# Patient Record
Sex: FEMALE | Race: WHITE | NOT HISPANIC OR LATINO | URBAN - METROPOLITAN AREA
[De-identification: names, ages, dates, MRNs, and addresses within clinical notes are randomized per-mention and may not be internally consistent; named-entity substitution may affect disease eponyms.]

---

## 2021-03-29 ENCOUNTER — HISTORICAL (OUTPATIENT)
Dept: ADMINISTRATIVE | Facility: HOSPITAL | Age: 56
End: 2021-03-29

## 2021-03-29 LAB
ABS NEUT (OLG): 2.41 X10(3)/MCL (ref 2.1–9.2)
ALBUMIN SERPL-MCNC: 4 GM/DL (ref 3.5–5)
ALBUMIN/GLOB SERPL: 1 RATIO (ref 1.1–2)
ALP SERPL-CCNC: 152 UNIT/L (ref 40–150)
ALT SERPL-CCNC: 14 UNIT/L (ref 0–55)
AST SERPL-CCNC: 17 UNIT/L (ref 5–34)
BASOPHILS # BLD AUTO: 0 X10(3)/MCL (ref 0–0.2)
BASOPHILS NFR BLD AUTO: 0 %
BILIRUB SERPL-MCNC: 0.5 MG/DL
BILIRUBIN DIRECT+TOT PNL SERPL-MCNC: 0.1 MG/DL (ref 0–0.5)
BILIRUBIN DIRECT+TOT PNL SERPL-MCNC: 0.4 MG/DL (ref 0–0.8)
BUN SERPL-MCNC: 11.7 MG/DL (ref 9.8–20.1)
CALCIUM SERPL-MCNC: 9.8 MG/DL (ref 8.4–10.2)
CEA SERPL-MCNC: 1.9 NG/ML (ref 0–3)
CHLORIDE SERPL-SCNC: 102 MMOL/L (ref 98–107)
CO2 SERPL-SCNC: 26 MMOL/L (ref 22–29)
CREAT SERPL-MCNC: 0.74 MG/DL (ref 0.55–1.02)
EOSINOPHIL # BLD AUTO: 0.1 X10(3)/MCL (ref 0–0.9)
EOSINOPHIL NFR BLD AUTO: 3 %
ERYTHROCYTE [DISTWIDTH] IN BLOOD BY AUTOMATED COUNT: 12.8 % (ref 11.5–14.5)
ESTRADIOL SERPL HS-MCNC: <24 PG/ML
FSH SERPL-ACNC: 65.7 MIU/ML
GLOBULIN SER-MCNC: 4.1 GM/DL (ref 2.4–3.5)
GLUCOSE SERPL-MCNC: 114 MG/DL (ref 74–100)
HCT VFR BLD AUTO: 44.5 % (ref 35–46)
HGB BLD-MCNC: 14.3 GM/DL (ref 12–16)
IMM GRANULOCYTES # BLD AUTO: 0.01 10*3/UL
IMM GRANULOCYTES NFR BLD AUTO: 0 %
LYMPHOCYTES # BLD AUTO: 1.1 X10(3)/MCL (ref 0.6–4.6)
LYMPHOCYTES NFR BLD AUTO: 27 %
MCH RBC QN AUTO: 29.5 PG (ref 26–34)
MCHC RBC AUTO-ENTMCNC: 32.1 GM/DL (ref 31–37)
MCV RBC AUTO: 91.9 FL (ref 80–100)
MONOCYTES # BLD AUTO: 0.3 X10(3)/MCL (ref 0.1–1.3)
MONOCYTES NFR BLD AUTO: 8 %
NEUTROPHILS # BLD AUTO: 2.41 X10(3)/MCL (ref 2.1–9.2)
NEUTROPHILS NFR BLD AUTO: 61 %
PLATELET # BLD AUTO: 243 X10(3)/MCL (ref 130–400)
PMV BLD AUTO: 10.7 FL (ref 7.4–10.4)
POTASSIUM SERPL-SCNC: 3.8 MMOL/L (ref 3.5–5.1)
PROT SERPL-MCNC: 8.1 GM/DL (ref 6.4–8.3)
RBC # BLD AUTO: 4.84 X10(6)/MCL (ref 4–5.2)
SODIUM SERPL-SCNC: 139 MMOL/L (ref 136–145)
WBC # SPEC AUTO: 4 X10(3)/MCL (ref 4.5–11)

## 2021-04-08 ENCOUNTER — HISTORICAL (OUTPATIENT)
Dept: RADIOLOGY | Facility: HOSPITAL | Age: 56
End: 2021-04-08

## 2021-04-21 ENCOUNTER — HISTORICAL (OUTPATIENT)
Dept: RADIOLOGY | Facility: HOSPITAL | Age: 56
End: 2021-04-21

## 2021-04-26 ENCOUNTER — HISTORICAL (OUTPATIENT)
Dept: RADIOLOGY | Facility: HOSPITAL | Age: 56
End: 2021-04-26

## 2021-04-30 ENCOUNTER — HISTORICAL (OUTPATIENT)
Dept: LAB | Facility: HOSPITAL | Age: 56
End: 2021-04-30

## 2021-04-30 LAB — SARS-COV-2 AG RESP QL IA.RAPID: NEGATIVE

## 2021-05-11 ENCOUNTER — HISTORICAL (OUTPATIENT)
Dept: SURGERY | Facility: HOSPITAL | Age: 56
End: 2021-05-11

## 2021-05-11 LAB — SARS-COV-2 AG RESP QL IA.RAPID: NEGATIVE

## 2021-05-25 ENCOUNTER — HISTORICAL (OUTPATIENT)
Dept: ADMINISTRATIVE | Facility: HOSPITAL | Age: 56
End: 2021-05-25

## 2021-05-25 LAB
ABS NEUT (OLG): 3.06 X10(3)/MCL (ref 2.1–9.2)
ALBUMIN SERPL-MCNC: 4 GM/DL (ref 3.5–5)
ALBUMIN/GLOB SERPL: 0.9 RATIO (ref 1.1–2)
ALP SERPL-CCNC: 174 UNIT/L (ref 40–150)
ALT SERPL-CCNC: 37 UNIT/L (ref 0–55)
AST SERPL-CCNC: 51 UNIT/L (ref 5–34)
BASOPHILS # BLD AUTO: 0 X10(3)/MCL (ref 0–0.2)
BASOPHILS NFR BLD AUTO: 0 %
BILIRUB SERPL-MCNC: 0.5 MG/DL
BILIRUBIN DIRECT+TOT PNL SERPL-MCNC: 0.2 MG/DL (ref 0–0.5)
BILIRUBIN DIRECT+TOT PNL SERPL-MCNC: 0.3 MG/DL (ref 0–0.8)
BUN SERPL-MCNC: 17.5 MG/DL (ref 9.8–20.1)
CALCIUM SERPL-MCNC: 10.1 MG/DL (ref 8.4–10.2)
CHLORIDE SERPL-SCNC: 106 MMOL/L (ref 98–107)
CO2 SERPL-SCNC: 24 MMOL/L (ref 22–29)
CREAT SERPL-MCNC: 0.76 MG/DL (ref 0.55–1.02)
EOSINOPHIL # BLD AUTO: 0.1 X10(3)/MCL (ref 0–0.9)
EOSINOPHIL NFR BLD AUTO: 2 %
ERYTHROCYTE [DISTWIDTH] IN BLOOD BY AUTOMATED COUNT: 13.2 % (ref 11.5–14.5)
GLOBULIN SER-MCNC: 4.3 GM/DL (ref 2.4–3.5)
GLUCOSE SERPL-MCNC: 100 MG/DL (ref 74–100)
HCT VFR BLD AUTO: 45.5 % (ref 35–46)
HGB BLD-MCNC: 14.7 GM/DL (ref 12–16)
IMM GRANULOCYTES # BLD AUTO: 0.01 10*3/UL
IMM GRANULOCYTES NFR BLD AUTO: 0 %
LYMPHOCYTES # BLD AUTO: 1.2 X10(3)/MCL (ref 0.6–4.6)
LYMPHOCYTES NFR BLD AUTO: 24 %
MCH RBC QN AUTO: 29.9 PG (ref 26–34)
MCHC RBC AUTO-ENTMCNC: 32.3 GM/DL (ref 31–37)
MCV RBC AUTO: 92.7 FL (ref 80–100)
MONOCYTES # BLD AUTO: 0.6 X10(3)/MCL (ref 0.1–1.3)
MONOCYTES NFR BLD AUTO: 11 %
NEUTROPHILS # BLD AUTO: 3.06 X10(3)/MCL (ref 2.1–9.2)
NEUTROPHILS NFR BLD AUTO: 62 %
NRBC BLD AUTO-RTO: 0 % (ref 0–0.2)
PLATELET # BLD AUTO: 228 X10(3)/MCL (ref 130–400)
PMV BLD AUTO: 10.6 FL (ref 7.4–10.4)
POTASSIUM SERPL-SCNC: 4.1 MMOL/L (ref 3.5–5.1)
PROT SERPL-MCNC: 8.3 GM/DL (ref 6.4–8.3)
RBC # BLD AUTO: 4.91 X10(6)/MCL (ref 4–5.2)
SODIUM SERPL-SCNC: 141 MMOL/L (ref 136–145)
WBC # SPEC AUTO: 4.9 X10(3)/MCL (ref 4.5–11)

## 2021-09-20 ENCOUNTER — HISTORICAL (OUTPATIENT)
Dept: ADMINISTRATIVE | Facility: HOSPITAL | Age: 56
End: 2021-09-20

## 2021-09-20 LAB
ABS NEUT (OLG): 3.69 X10(3)/MCL (ref 2.1–9.2)
ALBUMIN SERPL-MCNC: 3.8 GM/DL (ref 3.5–5)
ALBUMIN/GLOB SERPL: 0.9 RATIO (ref 1.1–2)
ALP SERPL-CCNC: 188 UNIT/L (ref 40–150)
ALT SERPL-CCNC: 11 UNIT/L (ref 0–55)
AST SERPL-CCNC: 20 UNIT/L (ref 5–34)
BASOPHILS # BLD AUTO: 0 X10(3)/MCL (ref 0–0.2)
BASOPHILS NFR BLD AUTO: 1 %
BILIRUB SERPL-MCNC: 0.4 MG/DL
BILIRUBIN DIRECT+TOT PNL SERPL-MCNC: 0.1 MG/DL (ref 0–0.5)
BILIRUBIN DIRECT+TOT PNL SERPL-MCNC: 0.3 MG/DL (ref 0–0.8)
BUN SERPL-MCNC: 14.1 MG/DL (ref 9.8–20.1)
CALCIUM SERPL-MCNC: 9.7 MG/DL (ref 8.4–10.2)
CHLORIDE SERPL-SCNC: 104 MMOL/L (ref 98–107)
CO2 SERPL-SCNC: 25 MMOL/L (ref 22–29)
CREAT SERPL-MCNC: 0.66 MG/DL (ref 0.55–1.02)
EOSINOPHIL # BLD AUTO: 0.2 X10(3)/MCL (ref 0–0.9)
EOSINOPHIL NFR BLD AUTO: 3 %
ERYTHROCYTE [DISTWIDTH] IN BLOOD BY AUTOMATED COUNT: 14.3 % (ref 11.5–14.5)
GLOBULIN SER-MCNC: 4.1 GM/DL (ref 2.4–3.5)
GLUCOSE SERPL-MCNC: 98 MG/DL (ref 74–100)
HCT VFR BLD AUTO: 39.2 % (ref 35–46)
HGB BLD-MCNC: 12.9 GM/DL (ref 12–16)
IMM GRANULOCYTES # BLD AUTO: 0.04 10*3/UL
IMM GRANULOCYTES NFR BLD AUTO: 1 %
LYMPHOCYTES # BLD AUTO: 1.4 X10(3)/MCL (ref 0.6–4.6)
LYMPHOCYTES NFR BLD AUTO: 23 %
MCH RBC QN AUTO: 29.3 PG (ref 26–34)
MCHC RBC AUTO-ENTMCNC: 32.9 GM/DL (ref 31–37)
MCV RBC AUTO: 89.1 FL (ref 80–100)
MONOCYTES # BLD AUTO: 1 X10(3)/MCL (ref 0.1–1.3)
MONOCYTES NFR BLD AUTO: 15 %
NEUTROPHILS # BLD AUTO: 3.69 X10(3)/MCL (ref 2.1–9.2)
NEUTROPHILS NFR BLD AUTO: 58 %
NRBC BLD AUTO-RTO: 0 % (ref 0–0.2)
PLATELET # BLD AUTO: 290 X10(3)/MCL (ref 130–400)
PMV BLD AUTO: 10.2 FL (ref 7.4–10.4)
POTASSIUM SERPL-SCNC: 4.9 MMOL/L (ref 3.5–5.1)
PROT SERPL-MCNC: 7.9 GM/DL (ref 6.4–8.3)
RBC # BLD AUTO: 4.4 X10(6)/MCL (ref 4–5.2)
SODIUM SERPL-SCNC: 138 MMOL/L (ref 136–145)
WBC # SPEC AUTO: 6.3 X10(3)/MCL (ref 4.5–11)

## 2021-10-08 ENCOUNTER — HISTORICAL (OUTPATIENT)
Dept: RADIOLOGY | Facility: HOSPITAL | Age: 56
End: 2021-10-08

## 2021-10-20 ENCOUNTER — HISTORICAL (OUTPATIENT)
Dept: ADMINISTRATIVE | Facility: HOSPITAL | Age: 56
End: 2021-10-20

## 2021-10-20 LAB
ABS NEUT (OLG): 2.95 X10(3)/MCL (ref 2.1–9.2)
ALBUMIN SERPL-MCNC: 4.6 GM/DL (ref 3.5–5)
ALBUMIN/GLOB SERPL: 1.2 RATIO (ref 1.1–2)
ALP SERPL-CCNC: 151 UNIT/L (ref 40–150)
ALT SERPL-CCNC: 16 UNIT/L (ref 0–55)
AST SERPL-CCNC: 26 UNIT/L (ref 5–34)
BASOPHILS # BLD AUTO: 0 X10(3)/MCL (ref 0–0.2)
BASOPHILS NFR BLD AUTO: 0 %
BILIRUB SERPL-MCNC: 0.7 MG/DL
BILIRUBIN DIRECT+TOT PNL SERPL-MCNC: 0.2 MG/DL (ref 0–0.5)
BILIRUBIN DIRECT+TOT PNL SERPL-MCNC: 0.5 MG/DL (ref 0–0.8)
BUN SERPL-MCNC: 13.6 MG/DL (ref 9.8–20.1)
CALCIUM SERPL-MCNC: 10.4 MG/DL (ref 8.4–10.2)
CHLORIDE SERPL-SCNC: 104 MMOL/L (ref 98–107)
CO2 SERPL-SCNC: 21 MMOL/L (ref 22–29)
CREAT SERPL-MCNC: 0.81 MG/DL (ref 0.55–1.02)
EOSINOPHIL # BLD AUTO: 0.2 X10(3)/MCL (ref 0–0.9)
EOSINOPHIL NFR BLD AUTO: 4 %
ERYTHROCYTE [DISTWIDTH] IN BLOOD BY AUTOMATED COUNT: 14.8 % (ref 11.5–14.5)
EST CREAT CLEARANCE SER (OHS): 57.82 ML/MIN
GLOBULIN SER-MCNC: 3.7 GM/DL (ref 2.4–3.5)
GLUCOSE SERPL-MCNC: 93 MG/DL (ref 74–100)
HCT VFR BLD AUTO: 46.4 % (ref 35–46)
HGB BLD-MCNC: 15 GM/DL (ref 12–16)
IMM GRANULOCYTES # BLD AUTO: 0.01 10*3/UL
IMM GRANULOCYTES NFR BLD AUTO: 0 %
LYMPHOCYTES # BLD AUTO: 0.7 X10(3)/MCL (ref 0.6–4.6)
LYMPHOCYTES NFR BLD AUTO: 17 %
MCH RBC QN AUTO: 29.2 PG (ref 26–34)
MCHC RBC AUTO-ENTMCNC: 32.3 GM/DL (ref 31–37)
MCV RBC AUTO: 90.4 FL (ref 80–100)
MONOCYTES # BLD AUTO: 0.5 X10(3)/MCL (ref 0.1–1.3)
MONOCYTES NFR BLD AUTO: 12 %
NEUTROPHILS # BLD AUTO: 2.95 X10(3)/MCL (ref 2.1–9.2)
NEUTROPHILS NFR BLD AUTO: 67 %
NRBC BLD AUTO-RTO: 0 % (ref 0–0.2)
PLATELET # BLD AUTO: 281 X10(3)/MCL (ref 130–400)
PMV BLD AUTO: 10.5 FL (ref 7.4–10.4)
POTASSIUM SERPL-SCNC: 4.2 MMOL/L (ref 3.5–5.1)
PROT SERPL-MCNC: 8.3 GM/DL (ref 6.4–8.3)
RBC # BLD AUTO: 5.13 X10(6)/MCL (ref 4–5.2)
SODIUM SERPL-SCNC: 137 MMOL/L (ref 136–145)
WBC # SPEC AUTO: 4.4 X10(3)/MCL (ref 4.5–11)

## 2021-12-28 ENCOUNTER — HISTORICAL (OUTPATIENT)
Dept: RADIOLOGY | Facility: HOSPITAL | Age: 56
End: 2021-12-28

## 2022-02-08 ENCOUNTER — HISTORICAL (OUTPATIENT)
Dept: ADMINISTRATIVE | Facility: HOSPITAL | Age: 57
End: 2022-02-08

## 2022-02-08 LAB
ABS NEUT (OLG): 2.2 (ref 2.1–9.2)
BASOPHILS # BLD AUTO: 0 10*3/UL (ref 0–0.2)
BASOPHILS NFR BLD AUTO: 1 %
BUN SERPL-MCNC: 14.6 MG/DL (ref 9.8–20.1)
CALCIUM SERPL-MCNC: 9.6 MG/DL (ref 8.7–10.5)
CHLORIDE SERPL-SCNC: 103 MMOL/L (ref 98–107)
CO2 SERPL-SCNC: 28 MMOL/L (ref 22–29)
CREAT SERPL-MCNC: 0.64 MG/DL (ref 0.55–1.02)
CREAT/UREA NIT SERPL: 23
EOSINOPHIL # BLD AUTO: 0.1 10*3/UL (ref 0–0.9)
EOSINOPHIL NFR BLD AUTO: 4 %
ERYTHROCYTE [DISTWIDTH] IN BLOOD BY AUTOMATED COUNT: 13.4 % (ref 11.5–14.5)
FLAG2 (OHS): 70
FLAG3 (OHS): 80
FLAGS (OHS): 80
GLUCOSE SERPL-MCNC: 102 MG/DL (ref 74–100)
HCT VFR BLD AUTO: 37.8 % (ref 35–46)
HGB BLD-MCNC: 12.6 G/DL (ref 12–16)
ICTERIC INTERF INDEX SERPL-ACNC: 1
IMM GRANULOCYTES # BLD AUTO: 0.01 10*3/UL
IMM GRANULOCYTES NFR BLD AUTO: 0 %
IMM. NE 2 SUSPECT FLAG (OHS): 20
INR PPP: 1 (ref 0.9–1.2)
LIPEMIC INTERF INDEX SERPL-ACNC: 8
LOW EVENT # SUSPECT FLAG (OHS): 80
LYMPHOCYTES # BLD AUTO: 0.8 10*3/UL (ref 0.6–4.6)
LYMPHOCYTES NFR BLD AUTO: 24 %
MANUAL DIFF? (OHS): NO
MCH RBC QN AUTO: 29.6 PG (ref 26–34)
MCHC RBC AUTO-ENTMCNC: 33.3 G/DL (ref 31–37)
MCV RBC AUTO: 88.7 FL (ref 80–100)
MO BLASTS SUSPECT FLAG (OHS): 40
MONOCYTES # BLD AUTO: 0.4 10*3/UL (ref 0.1–1.3)
MONOCYTES NFR BLD AUTO: 11 %
NEUTROPHILS # BLD AUTO: 2.2 10*3/UL (ref 2.1–9.2)
NEUTROPHILS NFR BLD AUTO: 61 %
NRBC BLD AUTO-RTO: 0 % (ref 0–0.2)
PLATELET # BLD AUTO: 213 10*3/UL (ref 130–400)
PLATELET CLUMPS SUSPECT FLAG (OHS): 30
PMV BLD AUTO: 10.2 FL (ref 7.4–10.4)
POTASSIUM SERPL-SCNC: 3.6 MMOL/L (ref 3.5–5.1)
PROTHROMBIN TIME: 13 S (ref 11.9–14.4)
RBC # BLD AUTO: 4.26 10*6/UL (ref 4–5.2)
SARS-COV-2 AG RESP QL IA.RAPID: NEGATIVE
SODIUM SERPL-SCNC: 137 MMOL/L (ref 136–145)
WBC # SPEC AUTO: 3.6 10*3/UL (ref 4.5–11)

## 2022-02-21 ENCOUNTER — HISTORICAL (OUTPATIENT)
Dept: ADMINISTRATIVE | Facility: HOSPITAL | Age: 57
End: 2022-02-21

## 2022-02-21 ENCOUNTER — HISTORICAL (OUTPATIENT)
Dept: RADIOLOGY | Facility: HOSPITAL | Age: 57
End: 2022-02-21

## 2022-03-04 ENCOUNTER — HISTORICAL (OUTPATIENT)
Dept: ADMINISTRATIVE | Facility: HOSPITAL | Age: 57
End: 2022-03-04

## 2022-03-04 LAB
ABS NEUT (OLG): 3.64 (ref 2.1–9.2)
APTT PPP: 30 S (ref 23.3–37)
BASOPHILS # BLD AUTO: 0 10*3/UL (ref 0–0.2)
BASOPHILS NFR BLD AUTO: 1 %
BUN SERPL-MCNC: 14.5 MG/DL (ref 9.8–20.1)
CALCIUM SERPL-MCNC: 10.1 MG/DL (ref 8.7–10.5)
CHLORIDE SERPL-SCNC: 102 MMOL/L (ref 98–107)
CO2 SERPL-SCNC: 29 MMOL/L (ref 22–29)
CREAT SERPL-MCNC: 0.72 MG/DL (ref 0.55–1.02)
CREAT/UREA NIT SERPL: 20
EOSINOPHIL # BLD AUTO: 0.2 10*3/UL (ref 0–0.9)
EOSINOPHIL NFR BLD AUTO: 3 %
ERYTHROCYTE [DISTWIDTH] IN BLOOD BY AUTOMATED COUNT: 14.2 % (ref 11.5–14.5)
FLAG2 (OHS): 70
FLAG3 (OHS): 80
FLAGS (OHS): 80
GLUCOSE SERPL-MCNC: 107 MG/DL (ref 74–100)
HCT VFR BLD AUTO: 43.5 % (ref 35–46)
HEMOLYSIS INTERF INDEX SERPL-ACNC: 41
HGB BLD-MCNC: 14.3 G/DL (ref 12–16)
ICTERIC INTERF INDEX SERPL-ACNC: 1
IMM GRANULOCYTES # BLD AUTO: 0.01 10*3/UL
IMM GRANULOCYTES NFR BLD AUTO: 0 %
IMM. NE 2 SUSPECT FLAG (OHS): 10
INR PPP: 0.99 (ref 0.9–1.2)
LIPEMIC INTERF INDEX SERPL-ACNC: 10
LOW EVENT # SUSPECT FLAG (OHS): 80
LYMPHOCYTES # BLD AUTO: 1 10*3/UL (ref 0.6–4.6)
LYMPHOCYTES NFR BLD AUTO: 18 %
MANUAL DIFF? (OHS): NO
MCH RBC QN AUTO: 29.9 PG (ref 26–34)
MCHC RBC AUTO-ENTMCNC: 32.9 G/DL (ref 31–37)
MCV RBC AUTO: 90.8 FL (ref 80–100)
MO BLASTS SUSPECT FLAG (OHS): 40
MONOCYTES # BLD AUTO: 0.5 10*3/UL (ref 0.1–1.3)
MONOCYTES NFR BLD AUTO: 10 %
NEUTROPHILS # BLD AUTO: 3.64 10*3/UL (ref 2.1–9.2)
NEUTROPHILS NFR BLD AUTO: 67 %
NRBC BLD AUTO-RTO: 0 % (ref 0–0.2)
PLATELET # BLD AUTO: 176 10*3/UL (ref 130–400)
PLATELET CLUMPS SUSPECT FLAG (OHS): 80
PMV BLD AUTO: 11.5 FL (ref 7.4–10.4)
POTASSIUM SERPL-SCNC: 3.2 MMOL/L (ref 3.5–5.1)
PROTHROMBIN TIME: 12.9 S (ref 11.9–14.4)
RBC # BLD AUTO: 4.79 10*6/UL (ref 4–5.2)
SARS-COV-2 AG RESP QL IA.RAPID: NEGATIVE
SODIUM SERPL-SCNC: 141 MMOL/L (ref 136–145)
WBC # SPEC AUTO: 5.4 10*3/UL (ref 4.5–11)

## 2022-03-07 ENCOUNTER — HISTORICAL (OUTPATIENT)
Dept: SURGERY | Facility: HOSPITAL | Age: 57
End: 2022-03-07

## 2022-03-07 ENCOUNTER — HISTORICAL (OUTPATIENT)
Dept: ADMINISTRATIVE | Facility: HOSPITAL | Age: 57
End: 2022-03-07

## 2022-04-30 NOTE — OP NOTE
Patient:   Nehemiah Savage             MRN: [id number]            FIN: [id number]               Age:   55 years     Sex:  Female     :  1965   Associated Diagnoses:   None   Author:   Joaquni Gibbons MD      Procedure   Bronchoscopy procedure   Date:  2021.     Date/ Time:  2021 09:08:00.     Confirmed: patient, procedure, side, site, safety procedures followed.     Performed by: self.     Informed consent: signed by patient.     Indication: adenopathy.     Preparation: NPO, pre-medications given anesthesia at bedside, pre-oxygenation  100  %.     Technique: type of bronchoscope flexible, route LMA, suctioning, monitoring during procedure (blood pressure monitoring, cardiac monitor, continuous pulse oximetry,  volumes/peak inspiratory pressure).     Findings: routine bronchoscopy performed.  Prior to procedure vocal cord spasm was noted.  This eventually resolved with time and albuterol dosing.  Routine bronchoscopy was then performed and patient's right mainstem bronchus was compressed around 50%.  She was taken.  Also had a tortuous trachea.  No endobronchial lesions noted.  Only mild amount of thick clear secretions seen.  EBUS was then performed and multiple biopsies obtained at 7R region and 4R region.  A smaller 4L region was noted however not biopsied.  Patient tolerated procedure well.  Mild hypoxia noted throughout procedure however this resolved with placing a towel under patient's shoulders..     Procedure tolerated: well.     Complications: None.     Estimated blood loss: No blood loss.

## 2022-05-02 NOTE — HISTORICAL [facility] CERNER
This is a historical note converted from David. Formatting and pictures may have been removed.  Please reference David for original formatting and attached multimedia. Chief Complaint  Breast cancer  History of Present Illness  Problem List:  -Left breast cancer, multifocal, lymph node positive, ER positive, NM positive, HER-2 negative, diagnosed 01/2014  -Liver lesions on CT angio A/P  -Mediastinal?and hilar lymphadenopathy  -Left axillary lymphadenopathy  ?  Current Treatment:  ?  Treatment History:  Past medical history: Hypertension.? Mediport placed 2014.? Incision and drainage of left axillary abscess.? Tubal ligation.? Migraine headaches.? Mitral valve disorder.? Anxiety.? GERD.? Chronic pain.? Gout.? Muscle spasms.? Borderline diabetes mellitus.? Pain in both knees.??Tobacco abuse.  Social history:?Single.? Lives in [place].? Has 6 children.? Lives with her son.? Does not work.? Smoked half a pack of usually for 20 years;?discontinued 6 years back.? No history of alcohol or illicit drug abuse.  Family history: Mother experienced breast cancer?at age 70 years.? Father experienced leukemia?at age 62 years.  Health maintenance:?No screening colonoscopy ever.? Says that she had screening mammogram done 2 years ago at [facility], apparently unremarkable.  Menstrual and OB/GYN history:?Menarche at age 12 years.? First child at age 16 years.? History of 2 abortions. ?No miscarriages.? Used birth control pills for 3 years.? No menstrual cycles?after chemotherapy.? Did not breast-feed her children.? No history of hormone replacement therapy.  ?  ?  History of Present Illness:  55-year-old lady referred by Dr. Evelio Tillman, to reestablish oncology care for her past history of breast cancer.  Refert to  note dated for detailed HPIm ?assessment and Plan  ?  Interval History 9/20/2021:  ?Patient presents today for scheduled follow up for history of?breast cancer. She reports feeling well but has  some confusion about CT scan report. Patient reports that she was informed by doctor who performed her surgery that her lymph nodes were stable. Therefore patient was unwilling at the time to complete follow up scans requested by . however, in order for patient to receive reconstruction breast surgery she would need to follow up with  for clearance post CT scans. Patient has agreed to have CT scan, therefore orders placed today. Lab work reviewed with patient CBC unremarkable, CMP stable, VS stable. Medications reviewed, no medications received from clinic. Denies any further questions or concerns.  Review of Systems  A complete 12-point ROS was performed in full with pertinent positives as described in interval history. Remainder of ROS done in full and are negative.  ?  Physical Exam  Vitals & Measurements  T:?36.6? ?C (Oral)? HR:?84(Peripheral)? RR:?20? BP:?108/76?  HT:?154.94?cm? WT:?75.200?kg? BMI:?31.32?  Physical Exam:  General: Alert and oriented, No acute distress.?  Appearance: Well-groomed wearing mask  HEENT: Normocephalic, Oral mucosa is moist. Pupils are equal, round and reactive to light, Extraocular movements are intact, Normal conjunctiva.?  Neck: Supple, Non-tender, No lymphadenopathy, No thyromegaly.?  Respiratory: Lungs are clear to auscultation, Respirations are non-labored, Breath sounds are equal, Symmetrical chest wall expansion.?  Cardiovascular: Normal rate, Regular rhythm, No edema.?  Breast: Breast exam not performed on todays visit.?  Gastrointestinal: Rounded, Soft, Non-tender, Non-distended, Normal bowel sounds.?  Musculoskeletal: Normal strength. Ambulates without assistance  Integumentary: Warm, dry, intact.?  Neurologic: Alert, Oriented, No focal deficits, Cranial Nerves II-XII are grossly intact.?  Cognition and Speech: Oriented, Speech clear and coherent.?  Psychiatric: Cooperative, Appropriate mood & affect.?  ECOG Performance Scale:?0 - No  restrictions  ?  Assessment/Plan  1.?Breast cancer?C50.919  #Left breast cancer:  -Left breast cancer, multifocal, lymph node positive, stage IIB, diagnosed 01/2014, ER positive, CA positive, HER-2 negative  -Neoadjuvant chemotherapy (02/2014-10/2014), with good response  -Delayed left MRM (04/17/2015): Residual DCIS; 1 lymph node with micrometastasis  -Noncompliant with adjuvant tamoxifen (started 05/2015; she stopped?11/2015)  -Adjuvant RT (06/2015-08/2015)  -Noncompliant with follow-ups  -Lost to oncology follow-up 07/30/2015-01/19/2017  -Lost to oncology follow-up after 01/19/2017  -Multiple hyperenhancing liver lesions, largest 1.5 cm, on CT angio A/P (02/10/2021)  (Suspicion of metastatic disease)  -04/08/2021: Bone scan: No bone metastasis  -04/08/2021: CT C/A/P with contrast: Mediastinal and right hilar lymphadenopathy; no lung nodule, consolidation, or effusion; no hepatic space-occupying mass lesion (AP window enlarged lymph nodes, the largest lymph node measures 2.4 x 0.9 cm; precarinal enlarged lymph node, 1.9 x 1.2 cm; subcarinal lymph node 1.9 x 1.5 cm)  -04/21/2021: PET/CT: Hypermetabolic left axillary soft tissue and mediastinal and hilar lymphadenopathy, concerning for metastatic disease; small mildly hypermetabolic cervical and left inguinal lymph nodes, nonspecific; no definite focal hypermetabolic liver lesions (hypermetabolic soft tissue left axilla 1.5 x 2.4 cm, maximum SUV seven 9.7; hypermetabolic mediastinal and bilateral hilar lymphadenopathy; presacral lymph node 1.9 x 2.1 cm, maximum SUV 6.7; subcarinal lymph node 1.5 cm, maximal SUV 6.7; uptake in the left hilum  SUV 5.2; maximum SUV in the right hilum 4.6)  (CTs, bone scan, PET/CT: 04/2021:?Suspicious?hypermetabolic left axillary soft tissue?and mediastinal and left hilar lymphadenopathy)  (No suspicious?liver lesions?on CTs and PET CT)  -03/29/2021: CEA 1.9, normal; CA 27.29 level 12.8, normal; CA 15-3 level 12.0, normal  -03/29/2021:  FSH 65.7 (postmenopausal); estradiol level <24  -04/26/2021: Bilateral screening mammogram (history of left mastectomy): Right breast negative (BI-RADS 1); left breast benign (BI-RADS 2)  -05/11/2021: Bronchoscopy: Right mainstem bronchus compressed 50%; no endobronchial lesions; EBUS TBNA 7R?region (subcarinal)?and 4R region (lower paratracheal)  Pathology: 7R: Reactive, insufficient cellularity; 4R: Suggestive of granulomas, insufficient cellularity  ?   Plan:  -Has been extremely noncompliant with recommended treatments and follow-ups  -Noted to have?multiple hyperenhancing liver lesions, largest 1.5 cm, on CT angiogram of abdomen and pelvis (02/10/2021, [facility])  -On CTs, bone scan, PET/CT (04/08/2021):?No suspicious liver lesions but suspicious?hypermetabolic?left axillary soft tissue and mediastinal and left hilar lymphadenopathy  -Bilateral mammogram (04/26/2021) unremarkable  -EBUS?(05/11/2021): 4R, 7R:?Insufficient cellularity;?suggestive of granulomas  ?   -Amenorrheic after chemotherapy; postmenopausal per labs?(03/29/2021)  ?   -Screening?bilateral mammogram (04/26/2021), unremarkable  -Repeat?bilateral?screening mammogram in 1 year (04/2022)  ?   -For close?follow-up, repeat contrast-enhanced CT scans of C/A/P in 3 months (July)  -Close clinical follow-up, especially for clinical evaluation of hypermetabolic left axillary soft tissue noted on PET/CT (04/08/2021)  -05/25/2021:?She has politely declined?follow-up CT scans?despite my explanation?that?by not pursuing?follow-up CT scans,?we may miss?developing malignancy  ?   9/20/2021: Patient has agreed to complete CT scan, so that she can receive clearance for reconstruction surgery.  ?   Follow up in 1 month with  for review of CT results and clearance for surgery  ?   Above discussed at length with her. ?All questions answered.  Discussed labs and scans and gave her copies of relevant reports.  She understands and agrees this  plan.  Referrals  Clinic Follow up, 09/20/21 14:30:00 CDT  Clinic Follow up, *Est. 10/20/21 3:00:00 CDT, f/u w/ CT results, Order for future visit, Breast cancer, [facility]  Clinic Follow up, 10/20/21 14:00:00 CDT  Lab Draw, 10/20/21 13:00:00 CDT, Order for future visit   Problem List/Past Medical History  Ongoing  Anemia  Arm pain  Chemotherapy  Difficulty sleeping  Fatigue  Hypertension  Lung cancer(  Confirmed  )  Migraine(  Confirmed  )  Mitral valve disorder  Obesity  Shortness of breath  Historical  Tobacco user(  Probable Diagnosis  )  Procedure/Surgical History  Bronchoscopy, Ebus, 2 or Less Samples (05/11/2021)  Bronchoscopy, rigid or flexible, including fluoroscopic guidance, when performed; diagnostic, with cell washing, when performed (separate procedure) (05/11/2021)  Bronchoscopy, rigid or flexible, including fluoroscopic guidance, when performed; with transendoscopic endobronchial ultrasound (EBUS) during bronchoscopic diagnostic or therapeutic intervention(s) for peripheral lesion(s) (List separately in addition to (05/11/2021)  Inspection of Tracheobronchial Tree, Via Natural or Artificial Opening Endoscopic (05/11/2021)  Axillary Node Dissection (Left) (04/17/2015)  Mastectomy Modified Radical (Left) (04/17/2015)  Mastectomy, modified radical, including axillary lymph nodes, with or without pectoralis minor muscle, but excluding pectoralis major muscle (04/17/2015)  Unilateral extended simple mastectomy (04/17/2015)  biopsy (01/01/2014)  lymph node removal (01/01/2014)  mediport (01/01/2014)  I&D lt auxillay abscess  lung bx  lymph node left removal  H/O tubal ligation   Medications  amLODIPine 2.5 mg oral tablet, 2.5 mg= 1 tab(s), Oral, Daily  ferrous sulfate 325 mg oral tablet, 325 mg= 1 tab(s), Oral, TID,? ?Not taking: not 3 times a day  gabapentin 100 mg oral capsule, 100 mg= 1 cap(s), Oral, TID  hydrochlorothiazide-lisinopril 25 mg-20 mg oral tablet, 1  tab(s), Oral, Daily  megestrol 40 mg/mL oral suspension, 800 mg= 20 mL, Oral, Daily, 1 refills  metoprolol succinate 50 mg oral capsule, extended release, 50 mg= 1 cap(s), Oral, Daily  mirtazapine 15 mg oral tablet, 15 mg= 1 tab(s), Oral, Once a day (at bedtime),? ?Not taking: takes sometimes  omeprazole 20 mg oral EC tablet (pt. own), 20 mg= 1 tab(s), Oral, BID  Phenergan 25 mg Tab  potassium chloride 20 mEq oral TABLET extended release, 20 mEq= 1 tab(s), Oral, Daily,? ?Not taking: pt states she does not take it like she should  ProAir HFA 90 mcg/inh inhalation aerosol with adapter  tamoxifen 20 mg oral tablet, 20 mg= 1 tab(s), Oral, Daily, 3 refills,? ?Not taking  topiramate 50 mg oral tablet, 50 mg= 1 tab(s), Oral, BID  traZODONE 50 mg oral tablet ( Desyrel ), 50 mg= 1 tab(s), Oral, Once a day (at bedtime)  Allergies  penicillins?(unknown)  Social History  Abuse/Neglect  No, No, Yes, 09/20/2021  Alcohol - Low Risk, 04/02/2015  Past, 09/20/2021  Employment/School - No Risk, 06/20/2014  Unemployed, 07/18/2014  Exercise - Does not exercise, 06/20/2014  Home/Environment - No Risk, 06/20/2014  Lives with Children., 07/18/2014  Nutrition/Health - No Risk, 06/20/2014  Regular, 07/18/2014  Sexual - No Risk, 06/20/2014  Substance Use - Denies Substance Abuse, 06/20/2014  Never, 09/20/2021  Tobacco - Low Risk, 04/02/2015  Former smoker, quit more than 30 days ago, No, 09/20/2021  Family History  Hypertension: Sister.  Hypertension.: Brother.  Hypothyroidism: Sister.  Leukemia: Father.  Primary malignant neoplasm of breast: Mother.  Health Maintenance  Health Maintenance  ???Pending?(in the next year)  ??? ??OverDue  ??? ? ? ?Alcohol Misuse Screening due??01/02/21??and every 1??year(s)  ??? ??Due?  ??? ? ? ?Aspirin Therapy for CVD Prevention due??09/20/21??and every 1??year(s)  ??? ? ? ?Cervical Cancer Screening due??09/20/21??Unknown Frequency  ??? ? ? ?Colorectal Screening due??09/20/21??Unknown Frequency  ??? ? ?  ?Medicare Annual Wellness Exam due??09/20/21??and every 1??year(s)  ??? ? ? ?Tetanus Vaccine due??09/20/21??and every 10??year(s)  ??? ? ? ?Zoster Vaccine due??09/20/21??Unknown Frequency  ??? ??Due In Future?  ??? ? ? ?Obesity Screening not due until??01/01/22??and every 1??year(s)  ??? ? ? ?ADL Screening not due until??04/26/22??and every 1??year(s)  ???Satisfied?(in the past 1 year)  ??? ??Satisfied?  ??? ? ? ?ADL Screening on??04/26/21.??Satisfied by Froylan Bridges  ??? ? ? ?Blood Pressure Screening on??09/20/21.??Satisfied by Claritza Ding  ??? ? ? ?Body Mass Index Check on??09/20/21.??Satisfied by Claritza Ding  ??? ? ? ?Breast Cancer Screening on??04/26/21.??Satisfied by Norma Landis  ??? ? ? ?Depression Screening on??09/20/21.??Satisfied by Timur Claritza  ??? ? ? ?Diabetes Screening on??09/20/21.??Satisfied by Edwin Baez  ??? ? ? ?Hypertension Management-Blood Pressure on??09/20/21.??Satisfied by Henry Dinga  ??? ? ? ?Influenza Vaccine on??09/20/21.??Satisfied by Timur Claritza  ??? ? ? ?Obesity Screening on??09/20/21.??Satisfied by Timur Claritza  ?

## 2022-05-02 NOTE — HISTORICAL [facility] CERNER
This is a historical note converted from David. Formatting and pictures may have been removed.  Please reference David for original formatting and attached multimedia. History of Present Illness  Past medical history: Hypertension.? Mediport placed 2014.? Incision and drainage of left axillary abscess.? Tubal ligation.? Migraine headaches.? Mitral valve disorder.? Anxiety.? GERD.? Chronic pain.? Gout.? Muscle spasms.? Borderline diabetes mellitus.? Pain in both knees.??Tobacco abuse.  Social history:?Single.? Lives in [place].? Has 6 children.? Lives with her son.? Does not work.? Smoked half a pack of usually for 20 years;?discontinued 6 years back.? No history of alcohol or illicit drug abuse.  Family history: Mother experienced breast cancer?at age 70 years.? Father experienced leukemia?at age 62 years.  Health maintenance:?No screening colonoscopy ever.? Says that she had screening mammogram done 2 years ago at [facility], apparently unremarkable.  Menstrual and OB/GYN history:?Menarche at age 12 years.? First child at age 16 years.? History of 2 abortions. ?No miscarriages.? Used birth control pills for 3 years.? No menstrual cycles?after chemotherapy.? Did not breast-feed her children.? No history of hormone replacement therapy.  ?  ?   Reason for follow-up:  -Left breast cancer, multifocal, lymph node positive, ER positive, [place] positive, HER-2 negative, diagnosed 01/2014  -Liver lesions on CT angio A/P  -Mediastinal?and hilar lymphadenopathy  -Left axillary lymphadenopathy  ?  ?   History of present illness:  55-year-old lady referred by Dr. Evelio Tillman, to reestablish oncology care for her past history of breast cancer.  ?   -Left breast cancer, diagnosed?01/16/2014  -Multifocal: 10:00 lesion, G2, DCIS; 12:00 lesion, IDC, DCIS positive  -Left axillary lymph node biopsy: Metastasis from breast cancer; too small for receptor status  -Repeat left axillary lymph node core biopsy (02/21/2014): G3,  metastasis from breast cancer, to: 2 lymph nodes positive, ER positive (90%), MS positive (40%), HER-2 negative  -Stage IIB  -Neoadjuvant DD AC x4 (02/24/2014-04/09/2014)  -PET/CT (04/03/2014): Increased activity pretracheal middle mediastinal, AP window, subcarinal zone, and bilateral hilar region (SUV 4.3)  -CT chest (04/22/2014): 6.5 x 4 cm left axillary node collection consistent with seroma, mediastinal and hilar lymphadenopathy concerning for intrathoracic metastasis  -Cervical mediastinoscopy (05/02/2014): Granulomatous lymphadenitis; negative for malignancy  -Neoadjuvant Taxol weekly x12 (05/30/2014-10/10/2014) (multiple delays secondary to neutropenia) (I&D of left axillary abscess 06/06/2013)  -11/11/2014: Ultrasound left breast (comparison: 12/31/2013): Left breast malignant mass has substantially reduced in size (2.7 x 1.0 x 2.3 cm, previously 3.0 x 2.7 x 2.9 cm); 2 suspicious small left axillary lymph nodes with thickened cortices (pathologically enlarged (lymph nodes have substantially reduced in size or have been removed in the interim); new 4 mm hypoechoic shadowing mass at 1:00, possible multicentric disease  -Much delayed left mastectomy, followed by adjuvant RT  ?   -04/17/2015: Left breast modified radical mastectomy:  1.? Left breast, simple mastectomy: Total mastectomy; axillary dissection; no residual invasive carcinoma after presurgical therapy; DCIS present (only DCIS is present after presurgical therapy); margins negative; total number of lymph nodes examined, 7; number of lymph nodes with macrometastasis, 1; size of largest metastatic deposit, 4 mm; no extranodal extension; lymphovascular invasion present  -->ypTis(DCIS) pN1a pMna  [In February 2014, 2 axillary lymph nodes were excised, 1 of which was positive for metastatic adenocarcinoma; these lymph nodes are not included in the lymph node total listed above)  ER positive (90%).? MS positive (40%).? HER-2 negative (score 1+)]  2.? Left  axillary contents: Metastatic breast adenocarcinoma involving 1 of 7 lymph nodes  ?   -Started adjuvant tamoxifen 05/2015; she stopped taking tamoxifen 11/2015; then, was lost to follow-up until seen in the oncology clinic on 01/19/2017  ?  -Adjuvant RT (06/23/2015-08/03/2015) (5000 cGy; 25 fractions; 41 elapsed days)  -Adjuvant tamoxifen started 05/2015; stopped taking tamoxifen 11/2015  -She stopped taking tamoxifen 11/2015  -01/19/2017: Advised to resume tamoxifen  -04/06/2017: Screening mammogram right breast ([facility]): Benign (BI-RADS 2)  -07/10/2017: Mediport removed  -No showed for oncology follow-up with us at [facility] on 07/19/2070  ?  -02/10/2021: [facility]: [facility] plastic and reconstructive surgery progress note: Last seen in the clinic 09/2020 for evaluation of breast reconstruction.? FNA of seroma fluid from left mastectomy site, 9 cm x 3 cm x 3 cm, 2 oclock position, 5 cm from nipple, 12/03/2020, negative for malignancy (3 mm, thick, dark fluid).? No oncology follow-up since 2017.? Patient advised to reestablish care with oncology, OB/GYN, and primary care.  ?  -02/10/2021: CT angios abdomen pelvis ([facility]):  -Multiple hyperenhancing lesions noted in the liver, largest 1.5 cm, indeterminate on this exam performed in the arterial phase only; MRI advised for further evaluation  ?  ?  Interval history:  03/29/2021:  Presents for initial medical oncology consultation.? Very pleasant lady. ?In no acute discomfort.  States that overall, she feels well and healthy?except for?chronic?pain in lower back and both knees.  Good appetite. ?No unintentional weight loss. ?No anorexia.? No undue weakness or fatigue.  No unusual headaches,?focal neurological symptoms, chest pain, cough,?dyspnea, hemoptysis, recurrent fevers or chills,?abdominal pains, nausea, vomiting, GI bleeding, and?jaundice, change in bowel habits,?hematemesis, melena, hematochezia, any urinary problems,?postmenopausal bleeding, etc.  Has  not noticed any lumps or lymphadenopathy.  ?  05/25/2021:  -04/26/2021: Bilateral screening mammogram (history of left mastectomy): Right breast negative (BI-RADS 1); left breast benign (BI-RADS 2)  -05/11/2021: Bronchoscopy: Right mainstem bronchus compressed 50%; no endobronchial lesions; EBUS TBNA 7R region (subcarinal) and 4R region (lower paratracheal)  Pathology: 7R: Reactive, insufficient cellularity; 4R: Suggestive of granulomas, insufficient cellularity  Presents for follow-up visit.? No complaints.? We discussed all labs, scans, and biopsy report.? No weakness, fatigue, malaise, unusual headaches, focal neurological symptoms, any new lumps or lymphadenopathy, chest pain, cough, dyspnea, anorexia, etc.  ?  ?  Review of systems:  All systems reviewed, and found to be negative except for the symptoms detailed above.  ?  ?  Physical examination:  VITAL SIGNS:? Reviewed.? ?  GENERAL:? In no apparent distress.?  HEAD:? No signs of head trauma.  EYES:? Pupils are equal.? Extraocular motions intact.?  EARS:? Hearing grossly intact.  MOUTH:? Oropharynx is normal.  NECK:? No adenopathy, no JVD.? ?  CHEST:? Chest with clear breath sounds bilaterally.? No wheezes, rales, or rhonchi.?  CARDIAC:? Regular rate and rhythm.? S1 and S2, without murmurs, gallops, or rubs.  VASCULAR:? No Edema.? Peripheral pulses normal and equal in all extremities.  ABDOMEN:? Soft, without detectable tenderness.? No sign of distention.? No? ?rebound or guarding, and no masses palpated.? ?Bowel Sounds normal.  MUSCULOSKELETAL:? Good range of motion of all major joints. Extremities without clubbing, cyanosis or edema.?  NEUROLOGIC EXAM:? Alert and oriented x 3.? No focal sensory or strength deficits.? ?Speech normal.? Follows commands.  PSYCHIATRIC:? Mood normal.  SKIN:? No rash or lesions.  03/29/2021:?Right pleasant lady. ?In no acute discomfort. ?Lungs clear.-Normal.? Abdomen soft, nontender, and without?palpable visceromegaly or masses.?  Breast examination performed with her verbal consent, in the presence of Danasia, LPN;?left mastectomy status, no signs of recurrence, no suspicious chest wall nodules/lumps/ulceration, or tenderness, no regional lymphadenopathy;?right breast soft and free from any lumps/masses,?skin or nipple changes, skin or nipple retraction, ulceration, skin thickening, or [place] appearance, without regional palpable lymphadenopathy.  04/26/2021: In no acute discomfort.? Examined in the presence of Danasia, LPN, with her consent.? No palpable lump or lymphadenopathy in left axilla.? No hyper lymphadenopathy in the left cervical, supraclavicular,?or infraclavicular areas.  ?  ?  Assessment:  #Left breast cancer:  -Left breast cancer, multifocal, lymph node positive, stage IIB, diagnosed 01/2014, ER positive, [place] positive, HER-2 negative  -Neoadjuvant chemotherapy (02/2014-10/2014), with good response  -Delayed left MRM (04/17/2015): Residual DCIS; 1 lymph node with micrometastasis  -Noncompliant with adjuvant tamoxifen (started 05/2015; she stopped?11/2015)  -Adjuvant RT (06/2015-08/2015)  -Noncompliant with follow-ups  -Lost to oncology follow-up 07/30/2015-01/19/2017  -Lost to oncology follow-up after 01/19/2017  -Multiple hyperenhancing liver lesions, largest 1.5 cm, on CT angio A/P (02/10/2021)  (Suspicion of metastatic disease)  -04/08/2021: Bone scan: No bone metastasis  -04/08/2021: CT C/A/P with contrast: Mediastinal and right hilar lymphadenopathy; no lung nodule, consolidation, or effusion; no hepatic space-occupying mass lesion (AP window enlarged lymph nodes, the largest lymph node measures 2.4 x 0.9 cm; precarinal enlarged lymph node, 1.9 x 1.2 cm; subcarinal lymph node 1.9 x 1.5 cm)  -04/21/2021: PET/CT: Hypermetabolic left axillary soft tissue and mediastinal and hilar lymphadenopathy, concerning for metastatic disease; small mildly hypermetabolic cervical and left inguinal lymph nodes, nonspecific; no definite  focal hypermetabolic liver lesions (hypermetabolic soft tissue left axilla 1.5 x 2.4 cm, maximum SUV seven 9.7; hypermetabolic mediastinal and bilateral hilar lymphadenopathy; presacral lymph node 1.9 x 2.1 cm, maximum SUV 6.7; subcarinal lymph node 1.5 cm, maximal SUV 6.7; uptake in the left hilum  SUV 5.2; maximum SUV in the right hilum 4.6)  (CTs, bone scan, PET/CT: 04/2021:?Suspicious?hypermetabolic left axillary soft tissue?and mediastinal and left hilar lymphadenopathy)  (No suspicious?liver lesions?on CTs and PET CT)  -03/29/2021: CEA 1.9, normal; CA 27.29 level 12.8, normal; CA 15-3 level 12.0, normal  -03/29/2021: FSH 65.7 (postmenopausal); estradiol level <24  -04/26/2021: Bilateral screening mammogram (history of left mastectomy): Right breast negative (BI-RADS 1); left breast benign (BI-RADS 2)  -05/11/2021: Bronchoscopy: Right mainstem bronchus compressed 50%; no endobronchial lesions; EBUS TBNA 7R?region (subcarinal)?and 4R region (lower paratracheal)  Pathology: 7R: Reactive, insufficient cellularity; 4R: Suggestive of granulomas, insufficient cellularity  ?  ?  Plan:  -Has been extremely noncompliant with recommended treatments and follow-ups  -Noted to have?multiple hyperenhancing liver lesions, largest 1.5 cm, on CT angiogram of abdomen and pelvis (02/10/2021, [facility])  -On CTs, bone scan, PET/CT (04/08/2021):?No suspicious liver lesions but suspicious?hypermetabolic?left axillary soft tissue and mediastinal and left hilar lymphadenopathy  -Bilateral mammogram (04/26/2021) unremarkable  -EBUS?(05/11/2021): 4R, 7R:?Insufficient cellularity;?suggestive of granulomas  ?  -Amenorrheic after chemotherapy; postmenopausal per labs?(03/29/2021)  ?  -Screening?bilateral mammogram (04/26/2021), unremarkable  -Repeat?bilateral?screening mammogram in 1 year (04/2022)  ?  -For close?follow-up, repeat contrast-enhanced CT scans of C/A/P in 3 months (July)  -Close clinical follow-up, especially for clinical  evaluation of hypermetabolic left axillary soft tissue noted on PET/CT (04/08/2021)  -05/25/2021:?She has politely declined?follow-up CT scans?despite my explanation?that?by not pursuing?follow-up CT scans,?we may miss?developing malignancy  ?  Follow-up with NP in July, with CBC and CMP; earlier,?if any concerns in the interim.??  ?  Above discussed at length with her. ?All questions answered.  Discussed labs and scans and gave her copies of relevant reports.  She understands and agrees this plan.  Physical Exam  Vitals & Measurements  T:?36.8? ?C (Oral)? HR:?81(Peripheral)? RR:?20? BP:?99/69?  HT:?154.94?cm? WT:?76.000?kg? BMI:?31.66?   Problem List/Past Medical History  Ongoing  Anemia  Arm pain  Chemotherapy  Difficulty sleeping  Fatigue  Hypertension  Lung cancer(  Confirmed  )  Migraine(  Confirmed  )  Mitral valve disorder  Obesity  Shortness of breath  Historical  Tobacco user(  Probable Diagnosis  )  Procedure/Surgical History  Bronchoscopy, [place], 2 or Less Samples (05/11/2021)  Bronchoscopy, rigid or flexible, including fluoroscopic guidance, when performed; diagnostic, with cell washing, when performed (separate procedure) (05/11/2021)  Bronchoscopy, rigid or flexible, including fluoroscopic guidance, when performed; with transendoscopic endobronchial ultrasound (EBUS) during bronchoscopic diagnostic or therapeutic intervention(s) for peripheral lesion(s) (List separately in addition to (05/11/2021)  Inspection of Tracheobronchial Tree, Via Natural or Artificial Opening Endoscopic (05/11/2021)  Axillary Node Dissection (Left) (04/17/2015)  Mastectomy Modified Radical (Left) (04/17/2015)  Mastectomy, modified radical, including axillary lymph nodes, with or without pectoralis minor muscle, but excluding pectoralis major muscle (04/17/2015)  Unilateral extended simple mastectomy (04/17/2015)  biopsy (01/01/2014)  lymph node removal (01/01/2014)  mediport (01/01/2014)  I&D lt auxillay abscess  lung  bx  lymph node left removal  H/O tubal ligation   Medications  amLODIPine 2.5 mg oral tablet, 2.5 mg= 1 tab(s), Oral, Daily  ferrous sulfate 325 mg oral tablet, 325 mg= 1 tab(s), Oral, TID,? ?Not taking: not 3 times a day  gabapentin 100 mg oral capsule, 100 mg= 1 cap(s), Oral, TID  hydrochlorothiazide-lisinopril 25 mg-20 mg oral tablet, 1 tab(s), Oral, Daily  megestrol 40 mg/mL oral suspension, 800 mg= 20 mL, Oral, Daily, 1 refills  metoprolol succinate 50 mg oral capsule, extended release, 50 mg= 1 cap(s), Oral, Daily  mirtazapine 15 mg oral tablet, 15 mg= 1 tab(s), Oral, Once a day (at bedtime),? ?Not taking: takes sometimes  omeprazole 20 mg oral EC tablet (pt. own), 20 mg= 1 tab(s), Oral, BID  Phenergan 25 mg Tab  potassium chloride 20 mEq oral TABLET extended release, 20 mEq= 1 tab(s), Oral, Daily,? ?Not taking: pt states she does not take it like she should  ProAir HFA 90 mcg/inh inhalation aerosol with adapter  tamoxifen 20 mg oral tablet, 20 mg= 1 tab(s), Oral, Daily, 3 refills,? ?Not taking  topiramate 50 mg oral tablet, 50 mg= 1 tab(s), Oral, BID  traZODONE 50 mg oral tablet ( Desyrel ), 50 mg= 1 tab(s), Oral, Once a day (at bedtime)  Allergies  penicillins?(unknown)  Social History  Abuse/Neglect  No, No, Yes, 05/11/2021  Alcohol - Low Risk, 04/02/2015  Past, 04/26/2021  Employment/School - No Risk, 06/20/2014  Unemployed, 07/18/2014  Exercise - Does not exercise, 06/20/2014  Home/Environment - No Risk, 06/20/2014  Lives with Children., 07/18/2014  Nutrition/Health - No Risk, 06/20/2014  Regular, 07/18/2014  Sexual - No Risk, 06/20/2014  Substance Use - Denies Substance Abuse, 06/20/2014  Never, 04/26/2021  Tobacco - Low Risk, 04/02/2015  Former smoker, quit more than 30 days ago, No, 05/11/2021  Family History  Hypertension: Sister.  Hypertension.: Brother.  Hypothyroidism: Sister.  Leukemia: Father.  Primary malignant neoplasm of breast: Mother.

## 2022-05-02 NOTE — HISTORICAL [facility] CERNER
This is a historical note converted from David. Formatting and pictures may have been removed.  Please reference David for original formatting and attached multimedia. History of Present Illness  Past medical history: Hypertension.? Mediport placed 2014.? Incision and drainage of left axillary abscess.? Tubal ligation.? Migraine headaches.? Mitral valve disorder.? Anxiety.? GERD.? Chronic pain.? Gout.? Muscle spasms.? Borderline diabetes mellitus.? Pain in both knees.??Tobacco abuse.  ?  Social history:?Single.? Lives in [place].? Has 6 children.? Lives with her son.? Does not work.? Smoked half a pack of usually for 20 years;?discontinued 6 years back.? No history of alcohol or illicit drug abuse.  ?  Family history: Mother experienced breast cancer?at age 70 years.? Father experienced leukemia?at age 62 years.  ?  Health maintenance:?No screening colonoscopy ever.? Says that she had screening mammogram done 2 years ago at [facility], apparently unremarkable.  ?  Menstrual and OB/GYN history:?Menarche at age 12 years.? First child at age 16 years.? History of 2 abortions. ?No miscarriages.? Used birth control pills for 3 years.? No menstrual cycles?after chemotherapy.? Did not breast-feed her children.? No history of hormone replacement therapy.  ?  History of present illness:  55-year-old lady referred by Dr. Evelio Tillman, to reestablish oncology care for her past history of breast cancer.  ?  -Left breast cancer, diagnosed?01/16/2014  -Multifocal: 10:00 lesion, G2, DCIS; 12:00 lesion, IDC, DCIS positive  -Left axillary lymph node biopsy: Metastasis from breast cancer; too small for receptor status  -Repeat left axillary lymph node core biopsy (02/21/2014): G3, metastasis from breast cancer, to: 2 lymph nodes positive, ER positive (90%), CT positive (40%), HER-2 negative  -Stage IIB  -Neoadjuvant DD AC x4 (02/24/2014-04/09/2014)  -PET/CT (04/03/2014): Increased activity pretracheal middle mediastinal,  AP window, subcarinal zone, and bilateral hilar region (SUV 4.3)  -CT chest (04/22/2014): 6.5 x 4 cm left axillary node collection consistent with seroma, mediastinal and hilar lymphadenopathy concerning for intrathoracic metastasis  -Cervical mediastinoscopy (05/02/2014): Granulomatous lymphadenitis; negative for malignancy  -Neoadjuvant Taxol weekly x12 (05/30/2014-10/10/2014) (multiple delays secondary to neutropenia) (I&D of left axillary abscess 06/06/2013)  -11/11/2014: Ultrasound left breast (comparison: 12/31/2013): Left breast malignant mass has substantially reduced in size (2.7 x 1.0 x 2.3 cm, previously 3.0 x 2.7 x 2.9 cm); 2 suspicious small left axillary lymph nodes with thickened cortices (pathologically enlarged (lymph nodes have substantially reduced in size or have been removed in the interim); new 4 mm hypoechoic shadowing mass at 1:00, possible multicentric disease  -Much delayed left mastectomy, followed by adjuvant RT  ?   -04/17/2015: Left breast modified radical mastectomy:  1.? Left breast, simple mastectomy: Total mastectomy; axillary dissection; no residual invasive carcinoma after presurgical therapy; DCIS present (only DCIS is present after presurgical therapy); margins negative; total number of lymph nodes examined, 7; number of lymph nodes with macrometastasis, 1; size of largest metastatic deposit, 4 mm; no extranodal extension; lymphovascular invasion present  -->ypTis(DCIS) pN1a pMna  [In February 2014, 2 axillary lymph nodes were excised, 1 of which was positive for metastatic adenocarcinoma; these lymph nodes are not included in the lymph node total listed above)  ER positive (90%).? OR positive (40%).? HER-2 negative (score 1+)]  2.? Left axillary contents: Metastatic breast adenocarcinoma involving 1 of 7 lymph nodes  ?   -Started adjuvant tamoxifen 05/2015; she stopped taking tamoxifen 11/2015; then, was lost to follow-up until seen in the oncology clinic on 01/19/2017  ?    -Adjuvant RT (06/23/2015-08/03/2015) (5000 cGy; 25 fractions; 41 elapsed days)  -Adjuvant tamoxifen started 05/2015; stopped taking tamoxifen 11/2015  -She stopped taking tamoxifen 11/2015  -01/19/2017: Advised to resume tamoxifen  -04/06/2017: Screening mammogram right breast ([facility]): Benign (BI-RADS 2)  -07/10/2017: Mediport removed  -No showed for oncology follow-up with us at [facility] on 07/19/2070  ?   -02/10/2021: [facility]: [facility] plastic and reconstructive surgery progress note: Last seen in the clinic 09/2020 for evaluation of breast reconstruction.? FNA of seroma fluid from left mastectomy site, 9 cm x 3 cm x 3 cm, 2 oclock position, 5 cm from nipple, 12/03/2020, negative for malignancy (3 mm, thick, dark fluid).? No oncology follow-up since 2017.? Patient advised to reestablish care with oncology, OB/GYN, and primary care.  ?   -02/10/2021: CT angios abdomen pelvis ([facility]):  -Multiple hyperenhancing lesions noted in the liver, largest 1.5 cm, indeterminate on this exam performed in the arterial phase only; MRI advised for further evaluation  ?  ?  Interval history:  03/29/2021:  Presents for initial medical oncology consultation.? Very pleasant lady. ?In no acute discomfort.  States that overall, she feels well and healthy?except for?chronic?pain in lower back and both knees.  Good appetite. ?No unintentional weight loss. ?No anorexia.? No undue weakness or fatigue.  No unusual headaches,?focal neurological symptoms, chest pain, cough,?dyspnea, hemoptysis, recurrent fevers or chills,?abdominal pains, nausea, vomiting, GI bleeding, and?jaundice, change in bowel habits,?hematemesis, melena, hematochezia, any urinary problems,?postmenopausal bleeding, etc.  Has not noticed any lumps or lymphadenopathy.  ?  ?  Review of systems:  All systems reviewed, and found to be negative except for the symptoms detailed above.  ?  ?  Physical examination:  VITAL SIGNS:? Reviewed.? ?  GENERAL:? In no apparent  distress.?  HEAD:? No signs of head trauma.  EYES:? Pupils are equal.? Extraocular motions intact.?  EARS:? Hearing grossly intact.  MOUTH:? Oropharynx is normal.  NECK:? No adenopathy, no JVD.? ?  CHEST:? Chest with clear breath sounds bilaterally.? No wheezes, rales, or rhonchi.?  CARDIAC:? Regular rate and rhythm.? S1 and S2, without murmurs, gallops, or rubs.  VASCULAR:? No Edema.? Peripheral pulses normal and equal in all extremities.  ABDOMEN:? Soft, without detectable tenderness.? No sign of distention.? No? ?rebound or guarding, and no masses palpated.? ?Bowel Sounds normal.  MUSCULOSKELETAL:? Good range of motion of all major joints. Extremities without clubbing, cyanosis or edema.?  NEUROLOGIC EXAM:? Alert and oriented x 3.? No focal sensory or strength deficits.? ?Speech normal.? Follows commands.  PSYCHIATRIC:? Mood normal.  SKIN:? No rash or lesions.  03/29/2021:?Right pleasant lady. ?In no acute discomfort. ?Lungs clear.-Normal.? Abdomen soft, nontender, and without?palpable visceromegaly or masses.? Breast examination performed with her verbal consent, in the presence of Danasia, LPN;?left mastectomy status, no signs of recurrence, no suspicious chest wall nodules/lumps/ulceration, or tenderness, no regional lymphadenopathy;?right breast soft and free from any lumps/masses,?skin or nipple changes, skin or nipple retraction, ulceration, skin thickening, or [place] appearance, without regional palpable lymphadenopathy.  ?  ?  Assessment:  #Left breast cancer:  -Left breast cancer, multifocal, lymph node positive, stage IIB, diagnosed 01/2014, ER positive, MD positive, HER-2 negative  -Neoadjuvant chemotherapy (02/2014-10/2014), with good response  -Mild to delayed left MRM (04/17/2015): Residual DCIS; 1 lymph node with micrometastasis  -Noncompliant with adjuvant tamoxifen (started 05/2015; she stopped the 11/2015)  -Adjuvant RT (06/2015-08/2015)  -Noncompliant with follow-ups  -Lost to oncology  follow-up 07/30/2015-01/19/2017  -Lost to oncology follow-up after 01/19/2017  -Multiple hyperenhancing liver lesions, largest 1.5 cm, on CT angio A/P (02/10/2021)  (Suspicion of metastatic disease)  ?  ?  Plan:  -Has been extremely noncompliant with recommended treatments and follow-ups  -Now, noted to have multiple hyperenhancing liver lesions, largest 1.5 cm, on CT angiogram of abdomen and pelvis (02/10/2021, [facility])  -->  -Suspicion of metastatic disease  ?  -Check CBC, CMP, CEA level, CA 15-3, and CA 27.29 level  -Amenorrheic after chemotherapy; check?serum FSH and estradiol level  -Stage with contrast-enhanced CT scans of C/A/P and whole-body nuclear medicine bone scan  -Biopsy any suspicious lesions (so far, on CT angiogram of A/P, multiple hyperenhancing liver lesions)  ?  -Screening mammogram of right breast at this time  ?  -Comprehensive germline and somatic profiling to identify candidacy for additional targeted therapies  ?  -Germline testing for BRCA1 and BRCA2 mutation (genetic testing)  -Check biopsy material for the following:  PIK3CA mutation  NTRK fusion  MSI-H/dMMR  TMB-H  -In case biopsy shows triple negative metastatic breast cancer, then, PD-L1 expression testing on biopsy tissue  ?  Follow-up in 3 weeks,?after labs, scans,?and biopsy of suspicious liver lesions.  ?  Above discussed at length with her. ?All questions answered.  Discussed labs?and scans. ?Discussed the suspicion of?metastatic disease?and plan of management.  Gave her a copy of?abdominal CT scan done performed at [facility].  She understands and agrees this plan.  Physical Exam  Vitals & Measurements  T:?37.0? ?C (Oral)? HR:?75(Peripheral)? RR:?16? BP:?127/88?  HT:?154.94?cm? WT:?78.500?kg? BMI:?32.7?   Problem List/Past Medical History  Ongoing  Anemia  Arm pain  Chemotherapy  Difficulty sleeping  Fatigue  Hypertension  Lung cancer(  Confirmed  )  Migraine(  Confirmed  )  Mitral valve disorder  Obesity  Shortness of  breath  Historical  Tobacco user(  Probable Diagnosis  )  Procedure/Surgical History  Axillary Node Dissection (Left) (04/17/2015)  Mastectomy Modified Radical (Left) (04/17/2015)  Mastectomy, modified radical, including axillary lymph nodes, with or without pectoralis minor muscle, but excluding pectoralis major muscle (04/17/2015)  Unilateral extended simple mastectomy (04/17/2015)  biopsy (01/01/2014)  lymph node removal (01/01/2014)  mediport (01/01/2014)  I&D lt auxillay abscess  lung bx  lymph node left removal  H/O tubal ligation   Medications  dexamethasone 4 mg/mL MDV solution, 10 mg= 2.5 mL, IV Push, Once-Unscheduled  diphenhdramine (for Inj.), 25 mg= 0.5 mL, IV, Once-Unscheduled  ferrous sulfate 325 mg oral tablet, 325 mg= 1 tab(s), Oral, TID,? ?Still taking, not as prescribed: not 3 times a day  Heparin Flush 100 U/mL - 5 mL, 300 units= 3 mL, IV Push, Once-Unscheduled  hydrochlorothiazide-lisinopril 25 mg-20 mg oral tablet, 1 tab(s), Oral, Daily  LORazepam 2mg/mL for IM / IV Push, 0.5 mg= 0.25 mL, IV Push, Once-Unscheduled, PRN  mirtazapine 15 mg oral tablet, 15 mg= 1 tab(s), Oral, Once a day (at bedtime),? ?Still taking, not as prescribed: takes sometimes  Neupogen 300 mcg/mL injectable solution, 300 mcg= 1 mL, Subcutaneous, Once-Unscheduled  omeprazole 20 mg oral EC tablet (pt. own), 20 mg= 1 tab(s), Oral, BID  paclitaxel 131 mg + Chemo Tubing 1 EA  palonosetron, 0.25 mg= 5 mL, IV, Once-Unscheduled  Pepcid (for IV Push), 20 mg= 2 mL, IV, Once-Unscheduled  Phenergan 25 mg Tab,? ?Not taking  potassium chloride 20 mEq oral TABLET extended release, 20 mEq= 1 tab(s), Oral, Daily,? ?Still taking, not as prescribed: pt states she does not take it like she should  ProAir HFA 90 mcg/inh inhalation aerosol with adapter  sumatriptan 50 mg oral tablet, 50 mg= 1 tab(s), Oral, Once, PRN,? ?Not taking  tamoxifen 20 mg oral tablet, 20 mg= 1 tab(s), Oral, Daily, 3 refills  topiramate 50 mg oral tablet, 50 mg= 1  tab(s), Oral, BID  Zofran 8 mg oral tablet, 1 tab, Oral, q8hr, PRN  Allergies  penicillins?(unknown)  Social History  Alcohol - Low Risk, 04/02/2015  Current, Liquor, 1-2 times per year, 1 drinks/episode average., 03/13/2015  Employment/School - No Risk, 06/20/2014  Unemployed, 07/18/2014  Exercise - Does not exercise, 06/20/2014  Home/Environment - No Risk, 06/20/2014  Lives with Children., 07/18/2014  Nutrition/Health - No Risk, 06/20/2014  Regular, 07/18/2014  Sexual - No Risk, 06/20/2014  Substance Use - Denies Substance Abuse, 06/20/2014  Tobacco - Low Risk, 04/02/2015  Former smoker, Cigarettes, 04/02/2015  Former smoker, Cigarettes, 03/13/2015  Current every day smoker, Cigarettes, 4 per day. 10 year(s)., 06/20/2014  Family History  Hypertension: Sister.  Hypertension.: Brother.  Hypothyroidism: Sister.  Leukemia: Father.  Primary malignant neoplasm of breast: Mother.

## 2022-05-02 NOTE — HISTORICAL [facility] CERNER
This is a historical note converted from David. Formatting and pictures may have been removed.  Please reference David for original formatting and attached multimedia. History of Present Illness  Past medical history: Hypertension.? Mediport placed 2014.? Incision and drainage of left axillary abscess.? Tubal ligation.? Migraine headaches.? Mitral valve disorder.? Anxiety.? GERD.? Chronic pain.? Gout.? Muscle spasms.? Borderline diabetes mellitus.? Pain in both knees.??Tobacco abuse.  Social history:?Single.? Lives in [place].? Has 6 children.? Lives with her son.? Does not work.? Smoked half a pack of usually for 20 years;?discontinued 6 years back.? No history of alcohol or illicit drug abuse.  Family history: Mother experienced breast cancer?at age 70 years.? Father experienced leukemia?at age 62 years.  Health maintenance:?No screening colonoscopy ever.? Says that she had screening mammogram done 2 years ago at [facility], apparently unremarkable.  Menstrual and OB/GYN history:?Menarche at age 12 years.? First child at age 16 years.? History of 2 abortions. ?No miscarriages.? Used birth control pills for 3 years.? No menstrual cycles?after chemotherapy.? Did not breast-feed her children.? No history of hormone replacement therapy.  ?  ?   Reason for follow-up:  -Left breast cancer, multifocal, lymph node positive, ER positive, NC positive, HER-2 negative, diagnosed 01/2014, neoadjuvant chemotherapy, mastectomy, adjuvant RT; noncompliant  -Liver lesions on CT angio A/P  -Mediastinal?and hilar lymphadenopathy  -Left axillary lymphadenopathy  ?  ?   History of present illness:  55-year-old lady referred by Dr. Evelio Tillman, to reestablish oncology care for her past history of breast cancer.  ?   -Left breast cancer, diagnosed?01/16/2014  -Multifocal: 10:00 lesion, G2, DCIS; 12:00 lesion, IDC, DCIS positive  -Left axillary lymph node biopsy: Metastasis from breast cancer; too small for receptor  status  -Repeat left axillary lymph node core biopsy (02/21/2014): G3, metastasis from breast cancer, to: 2 lymph nodes positive, ER positive (90%), MA positive (40%), HER-2 negative  -Stage IIB  -Neoadjuvant DD AC x4 (02/24/2014-04/09/2014)  -PET/CT (04/03/2014): Increased activity pretracheal middle mediastinal, AP window, subcarinal zone, and bilateral hilar region (SUV 4.3)  -CT chest (04/22/2014): 6.5 x 4 cm left axillary node collection consistent with seroma, mediastinal and hilar lymphadenopathy concerning for intrathoracic metastasis  -Cervical mediastinoscopy (05/02/2014): Granulomatous lymphadenitis; negative for malignancy  -Neoadjuvant Taxol weekly x12 (05/30/2014-10/10/2014) (multiple delays secondary to neutropenia) (I&D of left axillary abscess 06/06/2013)  -11/11/2014: Ultrasound left breast (comparison: 12/31/2013): Left breast malignant mass has substantially reduced in size (2.7 x 1.0 x 2.3 cm, previously 3.0 x 2.7 x 2.9 cm); 2 suspicious small left axillary lymph nodes with thickened cortices (pathologically enlarged (lymph nodes have substantially reduced in size or have been removed in the interim); new 4 mm hypoechoic shadowing mass at 1:00, possible multicentric disease  -Much delayed left mastectomy, followed by adjuvant RT  ?   -04/17/2015: Left breast modified radical mastectomy:  1.? Left breast, simple mastectomy: Total mastectomy; axillary dissection; no residual invasive carcinoma after presurgical therapy; DCIS present (only DCIS is present after presurgical therapy); margins negative; total number of lymph nodes examined, 7; number of lymph nodes with macrometastasis, 1; size of largest metastatic deposit, 4 mm; no extranodal extension; lymphovascular invasion present  -->ypTis(DCIS) pN1a pMna  [In February 2014, 2 axillary lymph nodes were excised, 1 of which was positive for metastatic adenocarcinoma; these lymph nodes are not included in the lymph node total listed above)  ER  positive (90%).? IN positive (40%).? HER-2 negative (score 1+)]  2.? Left axillary contents: Metastatic breast adenocarcinoma involving 1 of 7 lymph nodes  ?   -Started adjuvant tamoxifen 05/2015; she stopped taking tamoxifen 11/2015; then, was lost to follow-up until seen in the oncology clinic on 01/19/2017  ?  -Adjuvant RT (06/23/2015-08/03/2015) (5000 cGy; 25 fractions; 41 elapsed days)  -Adjuvant tamoxifen started 05/2015; stopped taking tamoxifen 11/2015  -She stopped taking tamoxifen 11/2015  -01/19/2017: Advised to resume tamoxifen  -04/06/2017: Screening mammogram right breast ([facility]): Benign (BI-RADS 2)  -07/10/2017: Mediport removed  -No showed for oncology follow-up with us at [facility] on 07/19/2070  ?  -02/10/2021: [facility]: U plastic and reconstructive surgery progress note: Last seen in the clinic 09/2020 for evaluation of breast reconstruction.? FNA of seroma fluid from left mastectomy site, 9 cm x 3 cm x 3 cm, 2 oclock position, 5 cm from nipple, 12/03/2020, negative for malignancy (3 mm, thick, dark fluid).? No oncology follow-up since 2017.? Patient advised to reestablish care with oncology, OB/GYN, and primary care.  ?  -02/10/2021: CT angios abdomen pelvis ([facility]):  -Multiple hyperenhancing lesions noted in the liver, largest 1.5 cm, indeterminate on this exam performed in the arterial phase only; MRI advised for further evaluation  ?  ?  Interval history:  03/29/2021:  Presents for initial medical oncology consultation.? Very pleasant lady. ?In no acute discomfort.  States that overall, she feels well and healthy?except for?chronic?pain in lower back and both knees.  Good appetite. ?No unintentional weight loss. ?No anorexia.? No undue weakness or fatigue.  No unusual headaches,?focal neurological symptoms, chest pain, cough,?dyspnea, hemoptysis, recurrent fevers or chills,?abdominal pains, nausea, vomiting, GI bleeding, and?jaundice, change in bowel habits,?hematemesis, melena,  hematochezia, any urinary problems,?postmenopausal bleeding, etc.  Has not noticed any lumps or lymphadenopathy.  ?  05/25/2021:  -04/26/2021: Bilateral screening mammogram (history of left mastectomy): Right breast negative (BI-RADS 1); left breast benign (BI-RADS 2)  -05/11/2021: Bronchoscopy: Right mainstem bronchus compressed 50%; no endobronchial lesions; EBUS TBNA 7R region (subcarinal) and 4R region (lower paratracheal)  Pathology: 7R: Reactive, insufficient cellularity; 4R: Suggestive of granulomas, insufficient cellularity  Presents for follow-up visit.? No complaints.? We discussed all labs, scans, and biopsy report.? No weakness, fatigue, malaise, unusual headaches, focal neurological symptoms, any new lumps or lymphadenopathy, chest pain, cough, dyspnea, anorexia, etc.  ?  10/20/2021:  -No showed 07/16/2021  -On 09/20/2021, she followed up with Nicole Bowden NP, and agreed to have CT scans done so that she can receive clearance for reconstructive surgery  -10/08/2021: Restaging CT C/A/P with contrast (comparison: 04/08/2021): Stable mediastinal and hilar lymph nodes (reference prevascular lymph node 8 mm short axis, stable; subcarinal lymph node 15 mm short axis, stable; other mediastinal and hilar lymph nodes showed no significant change; no new lymphadenopathy appreciated); mildly heterogeneous soft tissue density near the left axillary surgical clips appears slightly larger since April (18 x 25 mm, previously 14 x 23 mm) (no axillary lymphadenopathy); otherwise, no worsening sites of metastatic disease since 04/08/2021  Presents for follow-up visit.? Feels well.? Endorses no symptoms of concern.? No unusual headaches, focal neurological symptoms, chest pain, cough, dyspnea, any lumps or lymphadenopathy, chest pain, cough, dyspnea, abdominal pain, nausea, vomiting, weakness, fatigue, etc.  ?  ?  Review of systems:  All systems reviewed, and found to be negative except for the symptoms detailed  above.  ?  ?  Physical examination:  VITAL SIGNS:? Reviewed.? ?  GENERAL:? In no apparent distress.?  HEAD:? No signs of head trauma.  EYES:? Pupils are equal.? Extraocular motions intact.?  EARS:? Hearing grossly intact.  MOUTH:? Oropharynx is normal.  NECK:? No adenopathy, no JVD.? ?  CHEST:? Chest with clear breath sounds bilaterally.? No wheezes, rales, or rhonchi.?  CARDIAC:? Regular rate and rhythm.? S1 and S2, without murmurs, gallops, or rubs.  VASCULAR:? No Edema.? Peripheral pulses normal and equal in all extremities.  ABDOMEN:? Soft, without detectable tenderness.? No sign of distention.? No? ?rebound or guarding, and no masses palpated.? ?Bowel Sounds normal.  MUSCULOSKELETAL:? Good range of motion of all major joints. Extremities without clubbing, cyanosis or edema.?  NEUROLOGIC EXAM:? Alert and oriented x 3.? No focal sensory or strength deficits.? ?Speech normal.? Follows commands.  PSYCHIATRIC:? Mood normal.  SKIN:? No rash or lesions.  03/29/2021:?Right pleasant lady. ?In no acute discomfort. ?Lungs clear.-Normal.? Abdomen soft, nontender, and without?palpable visceromegaly or masses.? Breast examination performed with her verbal consent, in the presence of Danasia, LPN;?left mastectomy status, no signs of recurrence, no suspicious chest wall nodules/lumps/ulceration, or tenderness, no regional lymphadenopathy;?right breast soft and free from any lumps/masses,?skin or nipple changes, skin or nipple retraction, ulceration, skin thickening, or [place] appearance, without regional palpable lymphadenopathy.  04/26/2021: In no acute discomfort.? Examined in the presence of Danasia, LPN, with her consent.? No palpable lump or lymphadenopathy in left axilla.? No hyper lymphadenopathy in the left cervical, supraclavicular,?or infraclavicular areas.  ?  ?  Assessment:  #Left breast cancer:  -Left breast cancer, multifocal, lymph node positive, stage IIB, diagnosed 01/2014, ER positive, TX positive, HER-2  negative  -Neoadjuvant chemotherapy (02/2014-10/2014), with good response  -Delayed left MRM (04/17/2015): Residual DCIS; 1 lymph node with micrometastasis  -Noncompliant with adjuvant tamoxifen (started 05/2015; she stopped?11/2015)  -Adjuvant RT (06/2015-08/2015)  -Noncompliant with follow-ups  -Lost to oncology follow-up 07/30/2015-01/19/2017  -Lost to oncology follow-up after 01/19/2017  -Multiple hyperenhancing liver lesions, largest 1.5 cm, on CT angio A/P (02/10/2021)  (Suspicion of metastatic disease)  -04/08/2021: Bone scan: No bone metastasis  -04/08/2021: CT C/A/P with contrast: Mediastinal and right hilar lymphadenopathy; no lung nodule, consolidation, or effusion; no hepatic space-occupying mass lesion (AP window enlarged lymph nodes, the largest lymph node measures 2.4 x 0.9 cm; precarinal enlarged lymph node, 1.9 x 1.2 cm; subcarinal lymph node 1.9 x 1.5 cm)  -04/21/2021: PET/CT: Hypermetabolic left axillary soft tissue and mediastinal and hilar lymphadenopathy, concerning for metastatic disease; small mildly hypermetabolic cervical and left inguinal lymph nodes, nonspecific; no definite focal hypermetabolic liver lesions (hypermetabolic soft tissue left axilla 1.5 x 2.4 cm, maximum SUV seven 9.7; hypermetabolic mediastinal and bilateral hilar lymphadenopathy; presacral lymph node 1.9 x 2.1 cm, maximum SUV 6.7; subcarinal lymph node 1.5 cm, maximal SUV 6.7; uptake in the left hilum  SUV 5.2; maximum SUV in the right hilum 4.6)  (CTs, bone scan, PET/CT: 04/2021:?Suspicious?hypermetabolic left axillary soft tissue?and mediastinal and left hilar lymphadenopathy)  (No suspicious?liver lesions?on CTs and PET CT)  -03/29/2021: CEA 1.9, normal; CA 27.29 level 12.8, normal; CA 15-3 level 12.0, normal  -03/29/2021: FSH 65.7 (postmenopausal); estradiol level <24  -04/26/2021: Bilateral screening mammogram (history of left mastectomy): Right breast negative (BI-RADS 1); left breast benign (BI-RADS  2)  -05/11/2021: Bronchoscopy: Right mainstem bronchus compressed 50%; no endobronchial lesions; EBUS TBNA 7R?region (subcarinal)?and 4R region (lower paratracheal)  Pathology: 7R: Reactive, insufficient cellularity; 4R: Suggestive of granulomas, insufficient cellularity  -No showed 07/16/2021  -On 09/20/2021, she followed up with Nicole Bowden NP, and agreed to have CT scans done so that she can receive clearance for reconstructive surgery  -10/08/2021: Restaging CT C/A/P with contrast (comparison: 04/08/2021): Stable mediastinal and hilar lymph nodes (reference prevascular lymph node 8 mm short axis, stable; subcarinal lymph node 15 mm short axis, stable; other mediastinal and hilar lymph nodes showed no significant change; no new lymphadenopathy appreciated); mildly heterogeneous soft tissue density near the left axillary surgical clips appears slightly larger since April (18 x 25 mm, previously 14 x 23 mm) (no axillary lymphadenopathy); otherwise, no worsening sites of metastatic disease since 04/08/2021  ?  ?  Plan:  -Has been extremely noncompliant with recommended treatments and follow-ups  ?  -In the past,?she discontinued?tamoxifen after only 6 months of?adjuvant endocrine therapy?  -Noted to have?multiple hyperenhancing liver lesions, largest 1.5 cm, on CT angiogram of abdomen and pelvis (02/10/2021, [facility])  -However,?on CTs, bone scan, PET/CT (04/08/2021):?No suspicious liver lesions but suspicious?hypermetabolic?left axillary soft tissue and mediastinal and left hilar lymphadenopathy  -Bilateral mammogram (04/26/2021) unremarkable  -EBUS?(05/11/2021): 4R, 7R:?Insufficient cellularity;?suggestive of granulomas  -Restaging CT C/A/P (10/08/2021): Significant new finding:?Mildly heterogeneous soft tissue density near the left axillary surgical clips, slightly larger since April, 18 x 25 mm, previously?14 x 23 mm  >>>  -Will refer to?breast clinic for evaluation of?suspicious left axillary?soft tissue  density noted on restaging CTs?(10/08/2021)  -If no evidence of recurrence in left axilla, then,?will clear her from medical oncology perspective,?for?breast reconstruction surgery that she desires  ?  -Amenorrheic after chemotherapy; postmenopausal per labs?(03/29/2021)  ?  -Screening?bilateral mammogram (04/26/2021), unremarkable  -Repeat?bilateral?screening mammogram in 1 year (04/2022)  ?  Follow-up in 1 month,?after evaluation?in breast clinic.  ?  Above discussed at length with her. ?All questions answered.  Discussed labs and scans and gave her copies of relevant reports.  She understands and agrees this plan.  Physical Exam  Vitals & Measurements  T:?36.7? ?C (Oral)? HR:?90(Peripheral)? RR:?20? BP:?114/83? SpO2:?99%?  HT:?154.94?cm? WT:?76.6?kg?   Problem List/Past Medical History  Ongoing  Anemia  Arm pain  Chemotherapy  Difficulty sleeping  Fatigue  Hypertension  Lung cancer(  Confirmed  )  Migraine(  Confirmed  )  Mitral valve disorder  Obesity  Shortness of breath  Historical  Tobacco user(  Probable Diagnosis  )  Procedure/Surgical History  Bronchoscopy, [place], 2 or Less Samples (05/11/2021)  Bronchoscopy, rigid or flexible, including fluoroscopic guidance, when performed; diagnostic, with cell washing, when performed (separate procedure) (05/11/2021)  Bronchoscopy, rigid or flexible, including fluoroscopic guidance, when performed; with transendoscopic endobronchial ultrasound (EBUS) during bronchoscopic diagnostic or therapeutic intervention(s) for peripheral lesion(s) (List separately in addition to (05/11/2021)  Inspection of Tracheobronchial Tree, Via Natural or Artificial Opening Endoscopic (05/11/2021)  Axillary Node Dissection (Left) (04/17/2015)  Mastectomy Modified Radical (Left) (04/17/2015)  Mastectomy, modified radical, including axillary lymph nodes, with or without pectoralis minor muscle, but excluding pectoralis major muscle (04/17/2015)  Unilateral extended simple mastectomy  (04/17/2015)  biopsy (01/01/2014)  lymph node removal (01/01/2014)  mediport (01/01/2014)  I&D lt auxillay abscess  lung bx  lymph node left removal  H/O tubal ligation   Medications  amLODIPine 2.5 mg oral tablet, 2.5 mg= 1 tab(s), Oral, Daily  ferrous sulfate 325 mg oral tablet, 325 mg= 1 tab(s), Oral, TID,? ?Not taking: not 3 times a day  gabapentin 100 mg oral capsule, 100 mg= 1 cap(s), Oral, TID  hydrochlorothiazide-lisinopril 25 mg-20 mg oral tablet, 1 tab(s), Oral, Daily  megestrol 40 mg/mL oral suspension, 800 mg= 20 mL, Oral, Daily, 1 refills  metoprolol succinate 50 mg oral capsule, extended release, 50 mg= 1 cap(s), Oral, Daily  mirtazapine 15 mg oral tablet, 15 mg= 1 tab(s), Oral, Once a day (at bedtime),? ?Not taking: takes sometimes  omeprazole 20 mg oral EC tablet (pt. own), 20 mg= 1 tab(s), Oral, BID  Phenergan 25 mg Tab  potassium chloride 20 mEq oral TABLET extended release, 20 mEq= 1 tab(s), Oral, Daily,? ?Not taking: pt states she does not take it like she should  ProAir HFA 90 mcg/inh inhalation aerosol with adapter  tamoxifen 20 mg oral tablet, 20 mg= 1 tab(s), Oral, Daily, 3 refills,? ?Not taking  topiramate 50 mg oral tablet, 50 mg= 1 tab(s), Oral, BID  traZODONE 50 mg oral tablet ( Desyrel ), 50 mg= 1 tab(s), Oral, Once a day (at bedtime)  Allergies  penicillins?(unknown)  Social History  Abuse/Neglect  No, No, Yes, 09/20/2021  Alcohol - Low Risk, 04/02/2015  Past, 09/20/2021  Employment/School - No Risk, 06/20/2014  Unemployed, 07/18/2014  Exercise - Does not exercise, 06/20/2014  Home/Environment - No Risk, 06/20/2014  Lives with Children., 07/18/2014  Nutrition/Health - No Risk, 06/20/2014  Regular, 07/18/2014  Sexual - No Risk, 06/20/2014  Substance Use - Denies Substance Abuse, 06/20/2014  Never, 09/20/2021  Tobacco - Low Risk, 04/02/2015  Former smoker, quit more than 30 days ago, No, 09/20/2021  Family History  Hypertension: Sister.  Hypertension.: Brother.  Hypothyroidism:  Sister.  Leukemia: Father.  Primary malignant neoplasm of breast: Mother.

## 2022-05-14 NOTE — OP NOTE
Indication for Surgery  Ms. Savage is a 56-year-old woman no routine?surgery clinic for left-sided breast cancer?(stage IIb, ER positive, GA positive, HER-2 negative)?who?underwent neoadjuvant chemotherapy, left modified radical mastectomy in 2015, adjuvant radiotherapy?2015.? She has?been following for?left breast reconstruction with clearance but had?an area of concern in her left axilla on recent PET scan.? She underwent mag seed?placement 2/21/2022. ?She follows today for removal of?mass  Preoperative Diagnosis  1) Left?breast?cancer?s/p?neoadjuvant chemotherapy, left modified radical mastectomy,?adjuvant radiotherapy 2015  2)?possible residual disease of the left axilla  Postoperative Diagnosis  Same  Operation  Exploration of?left axilla?with mag seed guided?mass removal  Surgeon(s)  Staff: MD Sunshine  Resident:  MD Melinda Aden, MD  Anesthesia  General  Estimated Blood Loss  20 cc  Findings  1) Dense scar tissue of the left axilla  2) Necrotic lymph node likely 2/2 previous treatment of malignancy  3) Dense mass containing mag seed, removed  4) Copious amount of turbid, brown fluid from previous surgical site, likely old hematoma or necrotic tissue from previous treatment of malignancy  Specimen(s)  1) Dense mass containing mag seed  2) Fluctuant mass?of left axilla  3) Cultures of axillary fluid  Complications  none  Technique  After informed consent was obtained, patient was brought to the operating room placed in supine position.? She underwent induction of general?endotracheal anesthesia with no acute adverse?events. ?The skin?of the left axilla?and surrounding tissue was prepped and draped in usual sterile fashion. ?Timeout was held to confirm?pertinent patient information.  ?  The mag seed probe was used to localize?previously placed?mag seed?in the left axilla; this was?over?the previous?surgical scar. ?An incision?was made using 15 blade. ?The subcutaneous tissue was then divided using  electrocautery?and magseed probe was used continuously to guide further dissection.? Dissection was carried out?to include the mag seed containing tissue, which?also notably had previous surgical clips?surrounding specimen. ?This tissue was noted to be?very densely scarred down?and closely adjacent to surrounding blood vessels and?nerves?which were dissected free.? This was then sent for specimen.  ?  During?dissection,?there was?copious amounts of brown turbid fluid that was expressed from?previous?surgical site?and seem to be?continuous with?anterior chest wall fluid collection?seen on previous CT scan.? Cultures were taken.? Possibly old hematoma?or necrotic tissue from previous treatment?of?left breast malignancy.  ?  Finally there was an area more laterally?and inferiorly to mag seed?that was fluctuant and ballotable.? Dissection of this?tissue?was carried out using?electrocautery. ?It was noted to be adjacent to?nerve?that was carefully dissected free.? This mass was then sent for specimen.  ?  The cavity was closed?with interrupted?3-0 Vicryl sutures?but there was noted to the enlargement of dead space.? 19 Bulgarian Ciro drain was then left in the cavity to allow for?drainage. ?Skin was?closed using 4-0 Monocryl?running subcuticular suture. ?Sterile dressings applied.? All?counts were correct at the end of the case. ?Dr. Gonsalez was?present for all key portions of the?operation.  ?  Dharmesh Lawrence, PGY-3  ?  I agree with resident documentation. I was physically present, supervised resident, ?and discussed plan of care.  Evie Gonsalez MD  ?  ?

## 2022-05-14 NOTE — H&P
UPDATE H&P  No acute changes from last clinic visit. NPO since midnight. Procedure discussed with patient including R/B/A. All questions answered. Underwent mag seed placement 2/21  ?   Plan for OR today for lymph node excision  ?   Dharmesh Lawrence, PGY-3  ?   I agree with resident documentation. I was physically present, supervised resident, ?and discussed plan of care.  Evie Gonsalez MD  to OR for magseed guided mass excision  ?  Chief Complaint  Here to rescheduled surgery  History of Present Illness  Mrs Savage is a 56 year old female w Left breast cancer, multifocal, lymph node positive, stage IIB, diagnosed 01/2014, ER positive, IN positive, HER-2 negative  Who underwent neoadjuvant?chemotherapy?with good response,?left modified radical mastectomy in 2015?with?1 positive?lymph node of micrometastasis, status post adjuvant radiotherapy finished in 2015. ?Patient?states that she has been trying to?have clearance for?left breast?reconstruction.? However?PET scan showed?a?area of concern in her axilla.? This was recently biopsied?but?was negative. ?Concern is if there is residual disease?that was not taken on the?needle biopsy. ?Patient otherwise states feeling well.? She denies any new lesions?or palpable lymph nodes.?  After extensive conversation of?continue to observe this lesion or?undergo incision patient states that she would prefer undergoing excision of?this lymph node.  Patient states she is able to perform 4 METS and denies?taking any blood thinners.? She also?states that she has quit smoking years ago.  Review of Systems  Constitutional:?no fever, fatigue, weakness  Eye:?no eye redness, drainage, or pain  ENMT:?no sore throat, ear pain, sinus pain/congestion, nasal congestion/drainage  Respiratory:?no cough, no wheezing, no shortness of breath  Cardiovascular:?no chest pain, no palpitations, no edema  Gastrointestinal:?no nausea, vomiting, or diarrhea. no abdominal pain  Genitourinary:?no dysuria, no  urinary frequency or urgency, no hematuria  Neurologic: no headache, no dizziness, no weakness or numbness  ?  Physical Exam  ??Vitals & Measurements  ??T:?36.9? ?C (Oral)? HR:?64(Peripheral)? RR:?20? BP:?112/78?  ??HT:?155.00?cm? WT:?72.000?kg? BMI:?29.97?  General:?in no acute distress  Respiratory:? no increased work of breathing at room air  Cardiovascular:?regular rate and rhythm  Breast: Previous left mastectomy with?noticeable scarring, palpable?fluid,?no erythema no tenderness,?left axilla with?palpable?small area of?fluid but no hard lymph nodes,?palpable scarring in this area, right breast with no skin changes, no masses, no nipple retraction?and no?palpable axillary lymphedema  Gastrointestinal:?soft, non-tender, non-distended, no voluntary/involuntary guarding  Musculoskeletal:?moving all extremities  Neurologic: cranial nerves grossly intact  Assessment/Plan  Breast CA?C50.919  ??56-year-old female with left breast cancer status post left mastectomy with axillary lymph node dissection who presents?for evaluation of possible recurrence on the?left axilla. ?Biopsy performed by interventional radiologist showed?no malignancy. ?However concerns are for possible residual disease.  We have offered patient observation versus?need for?excisional biopsy of this lymph node. ?Patient understands the risk, benefits and alternatives for this procedure and?agrees to undergo?lymph node excision after performing a mag seed.  Patient is scheduled to have mag seed performed 2/21/22  Of note patient?is poor compliant with?appointments and she was scheduled initially to have this procedure performed but patient had to reschedule.  We have seen her today and she states that there are no?changes?in her health  She agrees to perform?lymph node excision on 3/7/22  ?  ??Laura Fowler MD  [employer] General Surgery PGY-3  ?  ??I agree with resident documentation. I was physically present, supervised resident, ?and discussed  plan of care.  Evie Gonsalez MD

## 2022-06-11 ENCOUNTER — TELEPHONE (OUTPATIENT)
Dept: HEMATOLOGY/ONCOLOGY | Facility: CLINIC | Age: 57
End: 2022-06-11

## 2022-06-11 PROBLEM — C50.912 INFILTRATING DUCTAL CARCINOMA OF LEFT BREAST: Status: ACTIVE | Noted: 2022-06-11

## 2022-06-11 PROBLEM — R59.0 MEDIASTINAL ADENOPATHY: Status: ACTIVE | Noted: 2022-06-11

## 2022-06-11 PROBLEM — C50.919 HORMONE RECEPTOR POSITIVE BREAST CANCER: Status: ACTIVE | Noted: 2022-06-11

## 2022-06-11 PROBLEM — K76.9 LIVER LESION: Status: ACTIVE | Noted: 2022-06-11

## 2022-06-13 ENCOUNTER — DOCUMENTATION ONLY (OUTPATIENT)
Dept: HEMATOLOGY/ONCOLOGY | Facility: CLINIC | Age: 57
End: 2022-06-13

## 2022-06-13 DIAGNOSIS — K76.9 LIVER LESION: ICD-10-CM

## 2022-06-13 DIAGNOSIS — C50.912 INFILTRATING DUCTAL CARCINOMA OF LEFT BREAST: Primary | ICD-10-CM

## 2022-06-13 NOTE — PROGRESS NOTES
No showed 06/13/2022      Past medical history: Hypertension.  Mediport placed 2014.  Incision and drainage of left axillary abscess.  Tubal ligation.  Migraine headaches.  Mitral valve disorder.  Anxiety.  GERD.  Chronic pain.  Gout.  Muscle spasms.  Borderline diabetes mellitus.  Pain in both knees.  Tobacco abuse.  Social history: Single.  Lives in [place].  Has 6 children.  Lives with her son.  Does not work.  Smoked half a pack of usually for 20 years; discontinued 6 years back.  No history of alcohol or illicit drug abuse.  Family history: Mother experienced breast cancer at age 70 years.  Father experienced leukemia at age 62 years.  Health maintenance: No screening colonoscopy ever.  Says that she had screening mammogram done 2 years ago at [facility], apparently unremarkable.  Menstrual and OB/GYN history: Menarche at age 12 years.  First child at age 16 years.  History of 2 abortions.  No miscarriages.  Used birth control pills for 3 years.  No menstrual cycles after chemotherapy.  Did not breast-feed her children.  No history of hormone replacement therapy.      Reason for follow-up:   -Left breast cancer, multifocal, lymph node positive, ER positive, ME positive, HER-2 negative, diagnosed 01/2014, neoadjuvant chemotherapy, mastectomy, adjuvant RT; noncompliant   -Liver lesions on CT angio A/P   -Mediastinal and hilar lymphadenopathy   -Left axillary lymphadenopathy      History of present illness:   55-year-old lady referred by Dr. Evelio Tillman, to reestablish oncology care for her past history of breast cancer.     -Left breast cancer, diagnosed 01/16/2014   -Multifocal: 10:00 lesion, G2, DCIS; 12:00 lesion, IDC, DCIS positive   -Left axillary lymph node biopsy: Metastasis from breast cancer; too small for receptor status   -Repeat left axillary lymph node core biopsy (02/21/2014): G3, metastasis from breast cancer, to: 2 lymph nodes positive, ER positive (90%), ME positive (40%), HER-2  negative   -Stage IIB   -Neoadjuvant DD AC x4 (02/24/2014-04/09/2014)   -PET/CT (04/03/2014): Increased activity pretracheal middle mediastinal, AP window, subcarinal zone, and bilateral hilar region (SUV 4.3)   -CT chest (04/22/2014): 6.5 x 4 cm left axillary node collection consistent with seroma, mediastinal and hilar lymphadenopathy concerning for intrathoracic metastasis   -Cervical mediastinoscopy (05/02/2014): Granulomatous lymphadenitis; negative for malignancy   -Neoadjuvant Taxol weekly x12 (05/30/2014-10/10/2014) (multiple delays secondary to neutropenia) (I&D of left axillary abscess 06/06/2013)   -11/11/2014: Ultrasound left breast (comparison: 12/31/2013): Left breast malignant mass has substantially reduced in size (2.7 x 1.0 x 2.3 cm, previously 3.0 x 2.7 x 2.9 cm); 2 suspicious small left axillary lymph nodes with thickened cortices (pathologically enlarged (lymph nodes have substantially reduced in size or have been removed in the interim); new 4 mm hypoechoic shadowing mass at 1:00, possible multicentric disease   -Much delayed left mastectomy, followed by adjuvant RT     -04/17/2015: Left breast modified radical mastectomy:   1.  Left breast, simple mastectomy: Total mastectomy; axillary dissection; no residual invasive carcinoma after presurgical therapy; DCIS present (only DCIS is present after presurgical therapy); margins negative; total number of lymph nodes examined, 7; number of lymph nodes with macrometastasis, 1; size of largest metastatic deposit, 4 mm; no extranodal extension; lymphovascular invasion present   -->ypTis(DCIS) pN1a pMna   [In February 2014, 2 axillary lymph nodes were excised, 1 of which was positive for metastatic adenocarcinoma; these lymph nodes are not included in the lymph node total listed above)   ER positive (90%).  VT positive (40%).  HER-2 negative (score 1+)]   2.  Left axillary contents: Metastatic breast adenocarcinoma involving 1 of 7 lymph nodes      -Started adjuvant tamoxifen 05/2015; she stopped taking tamoxifen 11/2015; then, was lost to follow-up until seen in the oncology clinic on 01/19/2017     -Adjuvant RT (06/23/2015-08/03/2015) (5000 cGy; 25 fractions; 41 elapsed days)   -Adjuvant tamoxifen started 05/2015; stopped taking tamoxifen 11/2015   -She stopped taking tamoxifen 11/2015   -01/19/2017: Advised to resume tamoxifen   -04/06/2017: Screening mammogram right breast ([facility]): Benign (BI-RADS 2)   -07/10/2017: Mediport removed   -No showed for oncology follow-up with us at [facility] on 07/19/2070     -02/10/2021: [facility]: U plastic and reconstructive surgery progress note: Last seen in the clinic 09/2020 for evaluation of breast reconstruction.  FNA of seroma fluid from left mastectomy site, 9 cm x 3 cm x 3 cm, 2 o'clock position, 5 cm from nipple, 12/03/2020, negative for malignancy (3 mm, thick, dark fluid).  No oncology follow-up since 2017.  Patient advised to reestablish care with oncology, OB/GYN, and primary care.     -02/10/2021: CT angios abdomen pelvis ([facility]):   -Multiple hyperenhancing lesions noted in the liver, largest 1.5 cm, indeterminate on this exam performed in the arterial phase only; MRI advised for further evaluation      Interval history:   03/29/2021:   Presents for initial medical oncology consultation.  Very pleasant lady.  In no acute discomfort.   States that overall, she feels well and healthy except for chronic pain in lower back and both knees.   Good appetite.  No unintentional weight loss.  No anorexia.  No undue weakness or fatigue.   No unusual headaches, focal neurological symptoms, chest pain, cough, dyspnea, hemoptysis, recurrent fevers or chills, abdominal pains, nausea, vomiting, GI bleeding, and jaundice, change in bowel habits, hematemesis, melena, hematochezia, any urinary problems, postmenopausal bleeding, etc.   Has not noticed any lumps or lymphadenopathy.    06/13/2022:   -03/07/2022:  Exploration of left axilla with mag seed guided mass removal: Dense scar tissue of left axilla, necrotic lymph node likely secondary to previous treatment of malignancy, dense mass containing mag seed, copious amount of turbid brown fluid from previous surgical site, likely old hematoma or necrotic tissue from previous treatment of malignancy   Pathology:   1.  Left axillary mass, excision: Fibroadipose tissue with chronically inflamed sclerotic fibrotic tissue   2.  Left axillary tissue, excision: Chronically inflamed sclerosing fibrotic tissue   3.  Left axillary tissue necrosis, excision: Fibroadipose tissue with dense sclerosing fibrosis, etc. no malignancy.   -no showed 04/28/2022      Review of systems:   All systems reviewed, and found to be negative except for the symptoms detailed above.      Physical examination:   VITAL SIGNS:  Reviewed.      GENERAL:  In no apparent distress.    HEAD:  No signs of head trauma.   EYES:  Pupils are equal.  Extraocular motions intact.    EARS:  Hearing grossly intact.   MOUTH:  Oropharynx is normal.   NECK:  No adenopathy, no JVD.      CHEST:  Chest with clear breath sounds bilaterally.  No wheezes, rales, or rhonchi.    CARDIAC:  Regular rate and rhythm.  S1 and S2, without murmurs, gallops, or rubs.   VASCULAR:  No Edema.  Peripheral pulses normal and equal in all extremities.   ABDOMEN:  Soft, without detectable tenderness.  No sign of distention.  No   rebound or guarding, and no masses palpated.   Bowel Sounds normal.   MUSCULOSKELETAL:  Good range of motion of all major joints. Extremities without clubbing, cyanosis or edema.    NEUROLOGIC EXAM:  Alert and oriented x 3.  No focal sensory or strength deficits.   Speech normal.  Follows commands.   PSYCHIATRIC:  Mood normal.   SKIN:  No rash or lesions.  03/29/2021: Right pleasant lady.  In no acute discomfort.  Lungs clear.-Normal.  Abdomen soft, nontender, and without palpable visceromegaly or masses.  Breast  examination performed with her verbal consent, in the presence of Danasia, LPN; left mastectomy status, no signs of recurrence, no suspicious chest wall nodules/lumps/ulceration, or tenderness, no regional lymphadenopathy; right breast soft and free from any lumps/masses, skin or nipple changes, skin or nipple retraction, ulceration, skin thickening, or Warren appearance, without regional palpable lymphadenopathy.  04/26/2021: In no acute discomfort.  Examined in the presence of Danasia, LPN, with her consent.  No palpable lump or lymphadenopathy in left axilla.  No hyper lymphadenopathy in the left cervical, supraclavicular, or infraclavicular areas.      Assessment:   #Left breast cancer:   -Left breast cancer, multifocal, lymph node positive, stage IIB, diagnosed 01/2014, ER positive, AR positive, HER-2 negative   -Neoadjuvant chemotherapy (02/2014-10/2014), with good response   -Delayed left MRM (04/17/2015): Residual DCIS; 1 lymph node with micrometastasis   -Noncompliant with adjuvant tamoxifen (started 05/2015; she stopped 11/2015)   -Adjuvant RT (06/2015-08/2015)   -Noncompliant with follow-ups   -Lost to oncology follow-up 07/30/2015-01/19/2017   -Lost to oncology follow-up after 01/19/2017   -Multiple hyperenhancing liver lesions, largest 1.5 cm, on CT angio A/P (02/10/2021)  (Suspicion of metastatic disease)  -04/08/2021: Bone scan: No bone metastasis   -04/08/2021: CT C/A/P with contrast: Mediastinal and right hilar lymphadenopathy; no lung nodule, consolidation, or effusion; no hepatic space-occupying mass lesion (AP window enlarged lymph nodes, the largest lymph node measures 2.4 x 0.9 cm; precarinal enlarged lymph node, 1.9 x 1.2 cm; subcarinal lymph node 1.9 x 1.5 cm)   -04/21/2021: PET/CT: Hypermetabolic left axillary soft tissue and mediastinal and hilar lymphadenopathy, concerning for metastatic disease; small mildly hypermetabolic cervical and left inguinal lymph nodes, nonspecific; no  definite focal hypermetabolic liver lesions (hypermetabolic soft tissue left axilla 1.5 x 2.4 cm, maximum SUV seven 9.7; hypermetabolic mediastinal and bilateral hilar lymphadenopathy; presacral lymph node 1.9 x 2.1 cm, maximum SUV 6.7; subcarinal lymph node 1.5 cm, maximal SUV 6.7; uptake in the left hilum   SUV 5.2; maximum SUV in the right hilum 4.6)  (CTs, bone scan, PET/CT: 04/2021: Suspicious hypermetabolic left axillary soft tissue and mediastinal and left hilar lymphadenopathy)   (No suspicious liver lesions on CTs and PET CT)   -03/29/2021: CEA 1.9, normal; CA 27.29 level 12.8, normal; CA 15-3 level 12.0, normal   -03/29/2021: FSH 65.7 (postmenopausal); estradiol level <24  -04/26/2021: Bilateral screening mammogram (history of left mastectomy): Right breast negative (BI-RADS 1); left breast benign (BI-RADS 2)   -05/11/2021: Bronchoscopy: Right mainstem bronchus compressed 50%; no endobronchial lesions; EBUS TBNA 7R region (subcarinal) and 4R region (lower paratracheal)   Pathology: 7R: Reactive, insufficient cellularity; 4R: Suggestive of granulomas, insufficient cellularity   -No showed 07/16/2021   -On 09/20/2021, she followed up with Nicole Bowden NP, and agreed to have CT scans done so that she can receive clearance for reconstructive surgery   -10/08/2021: Restaging CT C/A/P with contrast (comparison: 04/08/2021): Stable mediastinal and hilar lymph nodes (reference prevascular lymph node 8 mm short axis, stable; subcarinal lymph node 15 mm short axis, stable; other mediastinal and hilar lymph nodes showed no significant change; no new lymphadenopathy appreciated); mildly heterogeneous soft tissue density near the left axillary surgical clips appears slightly larger since April (18 x 25 mm, previously 14 x 23 mm) (no axillary lymphadenopathy); otherwise, no worsening sites of metastatic disease since 04/08/2021   -12/28/2021: Left breast 3.5 x 2.1 x 3.2 cm mass at 2:00, 10 cm from expected nipple,  BI-RADS 5 (highly suggestive of malignancy) ultrasound-guided needle core biopsy: No evidence of carcinoma   -03/07/2022: Exploration of left axilla: Removal of mass, dense scar tissue, necrotic lymph node, etc.: No malignancy      Plan:   Due for surveillance bilateral screening mammogram       -History of ER positive, KS positive, HER-2 negative multifocal left breast cancer, stage IIB, lymph node positive, diagnosed 01/2014, s/p neoadjuvant chemotherapy with good response, then delayed left MRM (04/17/2015)   -Has been extremely noncompliant with recommended treatments and follow-ups     -In the past, she discontinued tamoxifen after only 6 months of adjuvant endocrine therapy    -Noted to have multiple hyperenhancing liver lesions, largest 1.5 cm, on CT angiogram of abdomen and pelvis (02/10/2021, [facility])   -However, on CTs, bone scan, PET/CT (04/08/2021): No suspicious liver lesions but suspicious hypermetabolic left axillary soft tissue and mediastinal and left hilar lymphadenopathy   -Bilateral mammogram (04/26/2021) unremarkable   -EBUS (05/11/2021): 4R, 7R: Insufficient cellularity; suggestive of granulomas   -Restaging CT C/A/P (10/08/2021): Significant new finding: Mildly heterogeneous soft tissue density near the left axillary surgical clips, slightly larger since April, 18 x 25 mm, previously 14 x 23 mm   -12/28/2021: Left breast 3.5 x 2.1 x 3.2 cm mass at 2:00, 10 cm from expected nipple, BI-RADS 5 (highly suggestive of malignancy) ultrasound-guided needle core biopsy: No evidence of carcinoma  -03/07/2022: Exploration of left axilla: No malignancy     -Amenorrheic after chemotherapy; postmenopausal per labs (03/29/2021)     -Screening bilateral mammogram (04/26/2021), unremarkable   -Repeat bilateral screening mammogram in 1 year (04/2022)     Annual follow-up for surveillance.     Above discussed at length with her.  All questions answered.   Discussed labs and scans and gave her copies of relevant  reports.   She understands and agrees this plan.

## 2023-03-03 ENCOUNTER — TELEPHONE (OUTPATIENT)
Dept: HEMATOLOGY/ONCOLOGY | Facility: CLINIC | Age: 58
End: 2023-03-03
Payer: COMMERCIAL

## 2023-03-05 PROBLEM — C50.912 CARCINOMA OF LEFT BREAST, ESTROGEN RECEPTOR POSITIVE: Status: ACTIVE | Noted: 2023-03-05

## 2023-03-05 PROBLEM — Z91.199 NON-COMPLIANCE: Status: ACTIVE | Noted: 2023-03-05

## 2023-03-05 PROBLEM — Z90.12 H/O LEFT MASTECTOMY: Status: ACTIVE | Noted: 2023-03-05

## 2023-03-05 PROBLEM — Z17.0 CARCINOMA OF LEFT BREAST, ESTROGEN RECEPTOR POSITIVE: Status: ACTIVE | Noted: 2023-03-05

## 2023-03-05 PROBLEM — C50.912 INFILTRATING DUCTAL CARCINOMA OF LEFT BREAST: Status: RESOLVED | Noted: 2022-06-11 | Resolved: 2023-03-05

## 2023-03-06 ENCOUNTER — TELEPHONE (OUTPATIENT)
Dept: HEMATOLOGY/ONCOLOGY | Facility: CLINIC | Age: 58
End: 2023-03-06
Payer: COMMERCIAL